# Patient Record
Sex: MALE | Race: BLACK OR AFRICAN AMERICAN | NOT HISPANIC OR LATINO | RURAL
[De-identification: names, ages, dates, MRNs, and addresses within clinical notes are randomized per-mention and may not be internally consistent; named-entity substitution may affect disease eponyms.]

---

## 2024-10-28 ENCOUNTER — LAB VISIT (OUTPATIENT)
Dept: PRIMARY CARE CLINIC | Facility: CLINIC | Age: 39
End: 2024-10-28

## 2024-10-28 DIAGNOSIS — Z02.83 ENCOUNTER FOR DRUG SCREENING: Primary | ICD-10-CM

## 2024-10-28 PROCEDURE — 99000 SPECIMEN HANDLING OFFICE-LAB: CPT | Mod: ,,, | Performed by: NURSE PRACTITIONER

## 2024-12-21 ENCOUNTER — HOSPITAL ENCOUNTER (EMERGENCY)
Facility: HOSPITAL | Age: 39
Discharge: HOME | End: 2024-12-21
Attending: EMERGENCY MEDICINE

## 2024-12-21 VITALS
BODY MASS INDEX: 38.66 KG/M2 | TEMPERATURE: 98 F | RESPIRATION RATE: 20 BRPM | DIASTOLIC BLOOD PRESSURE: 82 MMHG | HEART RATE: 82 BPM | HEIGHT: 69 IN | SYSTOLIC BLOOD PRESSURE: 137 MMHG | OXYGEN SATURATION: 97 % | WEIGHT: 261 LBS

## 2024-12-21 DIAGNOSIS — R51.9 NONINTRACTABLE HEADACHE, UNSPECIFIED CHRONICITY PATTERN, UNSPECIFIED HEADACHE TYPE: Primary | ICD-10-CM

## 2024-12-21 LAB
AMPHET UR QL SCN: NEGATIVE
ANION GAP SERPL CALCULATED.3IONS-SCNC: 13 MMOL/L (ref 7–16)
BACTERIA #/AREA URNS HPF: ABNORMAL /HPF
BARBITURATES UR QL SCN: NEGATIVE
BASOPHILS # BLD AUTO: 0.04 K/UL (ref 0–0.2)
BASOPHILS NFR BLD AUTO: 0.4 % (ref 0–1)
BENZODIAZ METAB UR QL SCN: NEGATIVE
BILIRUB UR QL STRIP: NEGATIVE
BUN SERPL-MCNC: 15 MG/DL (ref 9–21)
BUN/CREAT SERPL: 12 (ref 6–20)
CALCIUM SERPL-MCNC: 8.9 MG/DL (ref 8.4–10.2)
CANNABINOIDS UR QL SCN: NEGATIVE
CHLORIDE SERPL-SCNC: 106 MMOL/L (ref 98–107)
CLARITY UR: ABNORMAL
CO2 SERPL-SCNC: 26 MMOL/L (ref 22–29)
COCAINE UR QL SCN: NEGATIVE
COLOR UR: ABNORMAL
CREAT SERPL-MCNC: 1.29 MG/DL (ref 0.72–1.25)
DIFFERENTIAL METHOD BLD: ABNORMAL
EGFR (NO RACE VARIABLE) (RUSH/TITUS): 72 ML/MIN/1.73M2
EOSINOPHIL # BLD AUTO: 0.05 K/UL (ref 0–0.5)
EOSINOPHIL NFR BLD AUTO: 0.6 % (ref 1–4)
ERYTHROCYTE [DISTWIDTH] IN BLOOD BY AUTOMATED COUNT: 12.9 % (ref 11.5–14.5)
GLUCOSE SERPL-MCNC: 105 MG/DL (ref 74–100)
GLUCOSE UR STRIP-MCNC: NORMAL MG/DL
HCT VFR BLD AUTO: 44.7 % (ref 40–54)
HGB BLD-MCNC: 14 G/DL (ref 13.5–18)
IMM GRANULOCYTES # BLD AUTO: 0.02 K/UL (ref 0–0.04)
IMM GRANULOCYTES NFR BLD: 0.2 % (ref 0–0.4)
KETONES UR STRIP-SCNC: NEGATIVE MG/DL
LEUKOCYTE ESTERASE UR QL STRIP: ABNORMAL
LYMPHOCYTES # BLD AUTO: 1.26 K/UL (ref 1–4.8)
LYMPHOCYTES NFR BLD AUTO: 13.9 % (ref 27–41)
MCH RBC QN AUTO: 30.4 PG (ref 27–31)
MCHC RBC AUTO-ENTMCNC: 31.3 G/DL (ref 32–36)
MCV RBC AUTO: 97 FL (ref 80–96)
MONOCYTES # BLD AUTO: 0.75 K/UL (ref 0–0.8)
MONOCYTES NFR BLD AUTO: 8.3 % (ref 2–6)
MPC BLD CALC-MCNC: 10.1 FL (ref 9.4–12.4)
MUCOUS, UA: ABNORMAL /LPF
NEUTROPHILS # BLD AUTO: 6.95 K/UL (ref 1.8–7.7)
NEUTROPHILS NFR BLD AUTO: 76.6 % (ref 53–65)
NITRITE UR QL STRIP: NEGATIVE
NRBC # BLD AUTO: 0 X10E3/UL
NRBC, AUTO (.00): 0 %
OPIATES UR QL SCN: NEGATIVE
PCP UR QL SCN: NEGATIVE
PH UR STRIP: 7 PH UNITS
PLATELET # BLD AUTO: 289 K/UL (ref 150–400)
POTASSIUM SERPL-SCNC: 4.5 MMOL/L (ref 3.5–5.1)
PROT UR QL STRIP: NEGATIVE
RBC # BLD AUTO: 4.61 M/UL (ref 4.6–6.2)
RBC # UR STRIP: NEGATIVE /UL
RBC #/AREA URNS HPF: 3 /HPF
SODIUM SERPL-SCNC: 140 MMOL/L (ref 136–145)
SP GR UR STRIP: 1.01
SQUAMOUS #/AREA URNS LPF: ABNORMAL /HPF
UROBILINOGEN UR STRIP-ACNC: NORMAL MG/DL
WBC # BLD AUTO: 9.07 K/UL (ref 4.5–11)
WBC #/AREA URNS HPF: 84 /HPF

## 2024-12-21 PROCEDURE — 99285 EMERGENCY DEPT VISIT HI MDM: CPT | Mod: 25

## 2024-12-21 PROCEDURE — 96374 THER/PROPH/DIAG INJ IV PUSH: CPT

## 2024-12-21 PROCEDURE — 36415 COLL VENOUS BLD VENIPUNCTURE: CPT | Performed by: EMERGENCY MEDICINE

## 2024-12-21 PROCEDURE — 85025 COMPLETE CBC W/AUTO DIFF WBC: CPT | Performed by: EMERGENCY MEDICINE

## 2024-12-21 PROCEDURE — 96375 TX/PRO/DX INJ NEW DRUG ADDON: CPT

## 2024-12-21 PROCEDURE — 80048 BASIC METABOLIC PNL TOTAL CA: CPT | Performed by: EMERGENCY MEDICINE

## 2024-12-21 PROCEDURE — 81003 URINALYSIS AUTO W/O SCOPE: CPT | Mod: 59 | Performed by: EMERGENCY MEDICINE

## 2024-12-21 PROCEDURE — 63600175 PHARM REV CODE 636 W HCPCS: Performed by: EMERGENCY MEDICINE

## 2024-12-21 PROCEDURE — 87086 URINE CULTURE/COLONY COUNT: CPT | Performed by: EMERGENCY MEDICINE

## 2024-12-21 PROCEDURE — 80307 DRUG TEST PRSMV CHEM ANLYZR: CPT | Performed by: EMERGENCY MEDICINE

## 2024-12-21 PROCEDURE — 63600175 PHARM REV CODE 636 W HCPCS: Performed by: NURSE PRACTITIONER

## 2024-12-21 RX ORDER — KETOROLAC TROMETHAMINE 15 MG/ML
15 INJECTION, SOLUTION INTRAMUSCULAR; INTRAVENOUS
Status: COMPLETED | OUTPATIENT
Start: 2024-12-21 | End: 2024-12-21

## 2024-12-21 RX ORDER — HYDRALAZINE HYDROCHLORIDE 20 MG/ML
10 INJECTION INTRAMUSCULAR; INTRAVENOUS
Status: COMPLETED | OUTPATIENT
Start: 2024-12-21 | End: 2024-12-21

## 2024-12-21 RX ADMIN — HYDRALAZINE HYDROCHLORIDE 10 MG: 20 INJECTION INTRAMUSCULAR; INTRAVENOUS at 04:12

## 2024-12-21 RX ADMIN — KETOROLAC TROMETHAMINE 15 MG: 15 INJECTION, SOLUTION INTRAMUSCULAR; INTRAVENOUS at 05:12

## 2024-12-21 NOTE — ED PROVIDER NOTES
Encounter Date: 12/21/2024       History     Chief Complaint   Patient presents with    Headache    Hematemesis     38 y/o male presents for reported headache and vomiting.  His headaches are actually frequent, but today severe.  His BP is elevated, he has been told this in the past and prescribed medications but reports has not taken them since probably October.  At that time he was in senior living.  There are no neurological deficits noted on exam.  Vomiting began this morning, also with diarrhea.  Denies fever.      Review of patient's allergies indicates:  No Known Allergies  History reviewed. No pertinent past medical history.  History reviewed. No pertinent surgical history.  No family history on file.     Review of Systems   Constitutional:  Negative for fever.   HENT:  Negative for sore throat.    Respiratory:  Negative for shortness of breath.    Cardiovascular:  Negative for chest pain.   Gastrointestinal:  Negative for nausea.   Genitourinary:  Negative for dysuria.   Musculoskeletal:  Negative for back pain.   Skin:  Negative for rash.   Neurological:  Positive for dizziness and headaches. Negative for weakness.   Hematological:  Does not bruise/bleed easily.   All other systems reviewed and are negative.      Physical Exam     Initial Vitals [12/21/24 1457]   BP Pulse Resp Temp SpO2   (!) 196/152 73 20 97.7 °F (36.5 °C) 97 %      MAP       --         Physical Exam    Constitutional: He appears well-developed and well-nourished.   HENT:   Head: Normocephalic.   Eyes: EOM are normal. Pupils are equal, round, and reactive to light.   Neck: Neck supple.   Cardiovascular:  Normal rate, regular rhythm, normal heart sounds and intact distal pulses.           Pulmonary/Chest: Breath sounds normal.   Abdominal: Abdomen is soft. Bowel sounds are normal.   Musculoskeletal:         General: Normal range of motion.      Cervical back: Neck supple.     Neurological: He is alert and oriented to person, place, and time. He  has normal strength. No cranial nerve deficit or sensory deficit. GCS score is 15. GCS eye subscore is 4. GCS verbal subscore is 5. GCS motor subscore is 6.   Skin: Skin is warm and dry. Capillary refill takes less than 2 seconds.   Psychiatric: He has a normal mood and affect. His behavior is normal. Thought content normal.         Medical Screening Exam   See Full Note    ED Course   Procedures  Labs Reviewed   CULTURE, URINE - Abnormal       Result Value    Culture, Urine >100,000 Streptococcus agalactiae (Group B) (*)    BASIC METABOLIC PANEL - Abnormal    Sodium 140      Potassium 4.5      Chloride 106      CO2 26      Anion Gap 13      Glucose 105 (*)     BUN 15      Creatinine 1.29 (*)     BUN/Creatinine Ratio 12      Calcium 8.9      eGFR 72     CBC WITH DIFFERENTIAL - Abnormal    WBC 9.07      RBC 4.61      Hemoglobin 14.0      Hematocrit 44.7      MCV 97.0 (*)     MCH 30.4      MCHC 31.3 (*)     RDW 12.9      Platelet Count 289      MPV 10.1      Neutrophils % 76.6 (*)     Lymphocytes % 13.9 (*)     Monocytes % 8.3 (*)     Eosinophils % 0.6 (*)     Basophils % 0.4      Immature Granulocytes % 0.2      nRBC, Auto 0.0      Neutrophils, Abs 6.95      Lymphocytes, Absolute 1.26      Monocytes, Absolute 0.75      Eosinophils, Absolute 0.05      Basophils, Absolute 0.04      Immature Granulocytes, Absolute 0.02      nRBC, Absolute 0.00      Diff Type Auto     URINALYSIS, REFLEX TO URINE CULTURE - Abnormal    Color, UA Light-Yellow      Clarity, UA Turbid      pH, UA 7.0      Leukocytes, UA Large (*)     Nitrites, UA Negative      Protein, UA Negative      Glucose, UA Normal      Ketones, UA Negative      Urobilinogen, UA Normal      Bilirubin, UA Negative      Blood, UA Negative      Specific Gravity, UA 1.009     URINALYSIS, MICROSCOPIC - Abnormal    WBC, UA 84 (*)     RBC, UA 3      Bacteria, UA Few (*)     Squamous Epithelial Cells, UA Occasional (*)     Mucous Occasional (*)    DRUG SCREEN, URINE (BEAKER) -  Normal    Barbiturates, Urine Negative      Benzodiazepine, Urine Negative      Opiates, Urine Negative      Phencyclidine, Urine Negative      Amphetamine, Urine Negative      Cannabinoid, Urine Negative      Cocaine, Urine Negative      Narrative:     This screen includes the following classes of drugs at the listed cut-off:    Benzodiazepines 200 ng/ml  Cocaine metabolite 300 ng/ml  Opiates 2000 ng/ml  Barbiturates 200 ng/ml  Amphetamines 500 ng/ml  Marijuana metabs (THC) 50 ng/ml  Phencyclidine (PCP) 25 ng/ml    This is a screening test. If results do not correlate with clinical presentation, then a confirmatory send out test is advised.   CBC W/ AUTO DIFFERENTIAL    Narrative:     The following orders were created for panel order CBC auto differential.  Procedure                               Abnormality         Status                     ---------                               -----------         ------                     CBC with Differential[[id number]]       Abnormal            Final result                 Please view results for these tests on the individual orders.          Imaging Results              CT Head Without Contrast (Final result)  Result time 12/21/24 15:45:47      Final result by Edgar Arias MD (12/21/24 15:45:47)                   Impression:      No acute intracranial process.  Additional evaluation, as clinically warranted.      Electronically signed by: Edgar Arias MD  Date:    12/21/2024  Time:    15:45               Narrative:    EXAMINATION:  CT HEAD WITHOUT CONTRAST    CLINICAL HISTORY:  Headache, new or worsening, neuro deficit (Age 19-49y);    TECHNIQUE:  Low dose axial images were obtained through the head.  Coronal and sagittal reformations were also performed. Contrast was not administered.    COMPARISON:  None.    FINDINGS:  The subcutaneous tissues are unremarkable.  The bony calvarium is intact.  The paranasal sinuses are unremarkable.  The mastoid air cells are clear.   The orbits and intraorbital contents are within normal limits.    The craniocervical junction is intact.  The sellar and parasellar structures are unremarkable.  There is no evidence of intracranial hemorrhage.  There are no extra-axial fluid collections.  There are calcifications along the falx and dura.    The ventricles and sulci are within normal limits.  The cisterns are unremarkable.  The gray-white differentiation is maintained.  There is no dense vessel sign.  There is no evidence of mass effect.                                       Medications   hydrALAZINE injection 10 mg (10 mg Intravenous Given 12/21/24 1610)   ketorolac injection 15 mg (15 mg Intravenous Given 12/21/24 1756)     Medical Decision Making  40 y/o male presents for reported headache and vomiting.  His headaches are actually frequent, but today severe.  His BP is elevated, he has been told this in the past and prescribed medications but reports has not taken them since probably October.  At that time [facility] was in halfway.  There are no neurological deficits noted on exam.  Vomiting began this morning, also with diarrhea.  Denies fever.    Amount and/or Complexity of Data Reviewed  Labs: ordered.  Radiology: ordered.    Risk  Prescription drug management.                                      Clinical Impression:   Final diagnoses:  [R51.9] Nonintractable headache, unspecified chronicity pattern, unspecified headache type (Primary)        ED Disposition Condition    Discharge Stable          ED Prescriptions    None       Follow-up Information       Follow up With Specialties Details Why Contact Info    PRIMARY CARE PROVIDER OR THIS DEPARTMENT  Schedule an appointment as soon as possible for a visit                Daniele Rivera FNP  12/23/24 0603

## 2024-12-21 NOTE — DISCHARGE INSTRUCTIONS
FOLLOW UP WITH PRIMARY CARE PROVIDER TO RECHECK BLOOD PRESSURE IN 7 TO 10 DAYS.  RETURN TO THE EMERGENCY DEPARTMENT AS NEEDED.

## 2024-12-21 NOTE — Clinical Note
"Nikolay Washingtonrick" Gregorio was seen and treated in our emergency department on 12/21/2024.  He may return to work on 12/23/2024.       If you have any questions or concerns, please don't hesitate to call.      Ciara CAMPOS    "

## 2024-12-21 NOTE — ED TRIAGE NOTES
Nikolay Gregorio, a 39 y.o. male presents to [facility] via POV with a chief complaint of HEADACHE AND VOMITING BLOOD X 1 DAY      Triage Note:  Chief Complaint   Patient presents with    Headache    Hematemesis     No, Primary Doctor  Review of patient's allergies indicates:  No Known Allergies  No past medical history on file.  No past surgical history on file.     No past surgical history on file.  Vitals:    12/21/24 1457   BP: (!) 196/152   Pulse: 73   Resp: 20   Temp: 97.7 °F (36.5 °C)     No current facility-administered medications for this encounter.     No current outpatient medications on file.

## 2024-12-23 LAB — UA COMPLETE W REFLEX CULTURE PNL UR: ABNORMAL

## 2024-12-24 ENCOUNTER — TELEPHONE (OUTPATIENT)
Dept: EMERGENCY MEDICINE | Facility: HOSPITAL | Age: 39
End: 2024-12-24

## 2024-12-24 RX ORDER — CEPHALEXIN 500 MG/1
500 CAPSULE ORAL EVERY 12 HOURS
Qty: 14 CAPSULE | Refills: 0 | Status: SHIPPED | OUTPATIENT
Start: 2024-12-24 | End: 2024-12-31

## 2024-12-24 NOTE — TELEPHONE ENCOUNTER
----- Message from TIFFANY Edwards sent at 12/24/2024  9:17 AM CST -----  Please notify the patient that I sent in a rx for antibiotics

## 2025-02-09 ENCOUNTER — HOSPITAL ENCOUNTER (EMERGENCY)
Facility: HOSPITAL | Age: 40
Discharge: HOME | End: 2025-02-09
Attending: EMERGENCY MEDICINE
Payer: COMMERCIAL

## 2025-02-09 VITALS
HEIGHT: 69 IN | OXYGEN SATURATION: 96 % | BODY MASS INDEX: 39.62 KG/M2 | WEIGHT: 267.5 LBS | SYSTOLIC BLOOD PRESSURE: 139 MMHG | DIASTOLIC BLOOD PRESSURE: 92 MMHG | HEART RATE: 89 BPM | TEMPERATURE: 99 F | RESPIRATION RATE: 18 BRPM

## 2025-02-09 DIAGNOSIS — R30.0 DYSURIA: ICD-10-CM

## 2025-02-09 DIAGNOSIS — K52.9 GASTROENTERITIS: Primary | ICD-10-CM

## 2025-02-09 DIAGNOSIS — L02.91 ABSCESS: ICD-10-CM

## 2025-02-09 DIAGNOSIS — R10.9 ABDOMINAL PAIN, UNSPECIFIED ABDOMINAL LOCATION: ICD-10-CM

## 2025-02-09 PROCEDURE — 99283 EMERGENCY DEPT VISIT LOW MDM: CPT

## 2025-02-09 RX ORDER — SULFAMETHOXAZOLE AND TRIMETHOPRIM 800; 160 MG/1; MG/1
1 TABLET ORAL 2 TIMES DAILY
Qty: 20 TABLET | Refills: 0 | Status: SHIPPED | OUTPATIENT
Start: 2025-02-09 | End: 2025-02-19

## 2025-02-09 NOTE — Clinical Note
"Nikolay Gregorio (Kendrick) was seen and treated in our emergency department on 2/9/2025.  He may return to work on 02/10/2025.       If you have any questions or concerns, please don't hesitate to call.      Lula CAMPOS    "

## 2025-02-09 NOTE — Clinical Note
"Nikolay Washingtonrick" Gregorio was seen and treated in our emergency department on 2/9/2025.  He may return to work on 02/09/2025.       If you have any questions or concerns, please don't hesitate to call.      Lula CAMPOS    "

## 2025-02-09 NOTE — DISCHARGE INSTRUCTIONS
Take antibiotics as prescribed.  Follow up in clinic with primary care provider in 2-3 days for recheck.  Return to emergency department for any worsening or further problems.

## 2025-02-09 NOTE — ED PROVIDER NOTES
"Encounter Date: 2/9/2025       History     Chief Complaint   Patient presents with    Abdominal Pain    Nausea    Vomiting     Presents to ED for complaints of abdominal pain, nausea, vomiting and hemorrhoids that started this morning.  Patient has history of bladder stimulator.  Patient also complaints of "boils" on his chest and under arms.     Patient is a 39-year-old male with history of hypertension and "prostate problems".  Patient states that a few days ago who is having nausea and vomiting and now is having some diarrhea and lower abdominal pain and cramping.  Patient also reports some dysuria that he states is typical of what he has had in the past with urinary tract infection.  Patient does note that he has seen blood when he wipes which has been going on for some time.  No blood in toilet.  No fever, no other acute problems or complaints at this time.  Patient does report some rectal pain.        Review of patient's allergies indicates:  No Known Allergies  No past medical history on file.  No past surgical history on file.  No family history on file.     Review of Systems   Gastrointestinal:  Positive for abdominal pain, diarrhea, nausea and vomiting.   Genitourinary:  Positive for dysuria.   All other systems reviewed and are negative.      Physical Exam     Initial Vitals [02/09/25 0721]   BP Pulse Resp Temp SpO2   (!) 139/92 89 18 98.7 °F (37.1 °C) 96 %      MAP       --         Physical Exam    Nursing note and vitals reviewed.  Constitutional: He appears well-developed and well-nourished.   HENT:   Head: Normocephalic and atraumatic. Mouth/Throat: Oropharynx is clear and moist.   Eyes: Pupils are equal, round, and reactive to light.   Neck: Neck supple.   Normal range of motion.  Cardiovascular:  Normal rate and regular rhythm.           Pulmonary/Chest: Effort normal and breath sounds normal.   Abdominal: Abdomen is soft. He exhibits no distension.   Genitourinary:    Penis normal.      " Genitourinary Comments: There is no visible or palpable perirectal abscess.  No visible external hemorrhoids.  No blood.     Musculoskeletal:         General: Normal range of motion.      Cervical back: Normal range of motion and neck supple.     Neurological: He is alert.   Skin: Skin is warm. Capillary refill takes less than 2 seconds.   There is a small superficial abscess of left chest and also in right axilla.  Both are very small and superficial.   Psychiatric: He has a normal mood and affect.         Medical Screening Exam   See Full Note    ED Course   Procedures  Labs Reviewed - No data to display       Imaging Results    None          Medications - No data to display  Medical Decision Making             ED Course as of 02/09/25 0741   Sun Feb 09, 2025   0731 Medical decision-making:  Differential diagnosis includes nausea, vomiting, diarrhea, gastroenteritis, abdominal pain, UTI, hemorrhoids, internal hemorrhoids. [BB]      ED Course User Index  [BB] Lázaro Dinh MD                           Clinical Impression:   Final diagnoses:  [K52.9] Gastroenteritis (Primary)  [R10.9] Abdominal pain, unspecified abdominal location  [R30.0] Dysuria  [L02.91] Abscess        ED Disposition Condition    Discharge Stable          ED Prescriptions       Medication Sig Dispense Start Date End Date Auth. Provider    sulfamethoxazole-trimethoprim 800-160mg (BACTRIM DS) 800-160 mg Tab Take 1 tablet by mouth 2 (two) times daily. for 10 days 20 tablet 2/9/2025 2/19/2025 Lázaro Dinh MD          Follow-up Information       Follow up With Specialties Details Why Contact Info    Yuri Velarde MD Family Medicine Schedule an appointment as soon as possible for a visit   [address] [facility] [address]  [phone]               Lázaro Dinh MD  02/09/25 1858

## 2025-03-01 ENCOUNTER — OFFICE VISIT (OUTPATIENT)
Dept: FAMILY MEDICINE | Facility: CLINIC | Age: 40
End: 2025-03-01
Payer: COMMERCIAL

## 2025-03-01 VITALS
OXYGEN SATURATION: 94 % | DIASTOLIC BLOOD PRESSURE: 97 MMHG | SYSTOLIC BLOOD PRESSURE: 144 MMHG | BODY MASS INDEX: 40.58 KG/M2 | HEIGHT: 69 IN | RESPIRATION RATE: 20 BRPM | WEIGHT: 274 LBS | TEMPERATURE: 98 F | HEART RATE: 74 BPM

## 2025-03-01 DIAGNOSIS — Z72.51 HIGH RISK HETEROSEXUAL BEHAVIOR: ICD-10-CM

## 2025-03-01 DIAGNOSIS — K59.00 CONSTIPATION, UNSPECIFIED CONSTIPATION TYPE: ICD-10-CM

## 2025-03-01 DIAGNOSIS — I10 HYPERTENSION, UNSPECIFIED TYPE: ICD-10-CM

## 2025-03-01 DIAGNOSIS — N40.0 BENIGN PROSTATIC HYPERPLASIA, UNSPECIFIED WHETHER LOWER URINARY TRACT SYMPTOMS PRESENT: Primary | ICD-10-CM

## 2025-03-01 DIAGNOSIS — K64.9 HEMORRHOIDS, UNSPECIFIED HEMORRHOID TYPE: ICD-10-CM

## 2025-03-01 PROCEDURE — 99051 MED SERV EVE/WKEND/HOLIDAY: CPT | Mod: ,,, | Performed by: FAMILY MEDICINE

## 2025-03-01 PROCEDURE — 83036 HEMOGLOBIN GLYCOSYLATED A1C: CPT | Mod: GZ,,, | Performed by: CLINICAL MEDICAL LABORATORY

## 2025-03-01 PROCEDURE — 3008F BODY MASS INDEX DOCD: CPT | Mod: CPTII,,, | Performed by: FAMILY MEDICINE

## 2025-03-01 PROCEDURE — 87491 CHLMYD TRACH DNA AMP PROBE: CPT | Mod: ,,, | Performed by: CLINICAL MEDICAL LABORATORY

## 2025-03-01 PROCEDURE — 1160F RVW MEDS BY RX/DR IN RCRD: CPT | Mod: CPTII,,, | Performed by: FAMILY MEDICINE

## 2025-03-01 PROCEDURE — 3080F DIAST BP >= 90 MM HG: CPT | Mod: CPTII,,, | Performed by: FAMILY MEDICINE

## 2025-03-01 PROCEDURE — 1159F MED LIST DOCD IN RCRD: CPT | Mod: CPTII,,, | Performed by: FAMILY MEDICINE

## 2025-03-01 PROCEDURE — 87661 TRICHOMONAS VAGINALIS AMPLIF: CPT | Mod: ,,, | Performed by: CLINICAL MEDICAL LABORATORY

## 2025-03-01 PROCEDURE — 3077F SYST BP >= 140 MM HG: CPT | Mod: CPTII,,, | Performed by: FAMILY MEDICINE

## 2025-03-01 PROCEDURE — 86780 TREPONEMA PALLIDUM: CPT | Mod: ,,, | Performed by: CLINICAL MEDICAL LABORATORY

## 2025-03-01 PROCEDURE — 80053 COMPREHEN METABOLIC PANEL: CPT | Mod: ,,, | Performed by: CLINICAL MEDICAL LABORATORY

## 2025-03-01 PROCEDURE — 86803 HEPATITIS C AB TEST: CPT | Mod: ,,, | Performed by: CLINICAL MEDICAL LABORATORY

## 2025-03-01 PROCEDURE — 99204 OFFICE O/P NEW MOD 45 MIN: CPT | Mod: ,,, | Performed by: FAMILY MEDICINE

## 2025-03-01 PROCEDURE — 85025 COMPLETE CBC W/AUTO DIFF WBC: CPT | Mod: ,,, | Performed by: CLINICAL MEDICAL LABORATORY

## 2025-03-01 PROCEDURE — 87389 HIV-1 AG W/HIV-1&-2 AB AG IA: CPT | Mod: ,,, | Performed by: CLINICAL MEDICAL LABORATORY

## 2025-03-01 PROCEDURE — 87591 N.GONORRHOEAE DNA AMP PROB: CPT | Mod: ,,, | Performed by: CLINICAL MEDICAL LABORATORY

## 2025-03-01 RX ORDER — POLYETHYLENE GLYCOL 3350 17 G/17G
POWDER, FOR SOLUTION ORAL
Qty: 507 G | Refills: 0 | Status: SHIPPED | OUTPATIENT
Start: 2025-03-01

## 2025-03-01 RX ORDER — HYDROCHLOROTHIAZIDE 25 MG/1
25 TABLET ORAL DAILY
Qty: 90 TABLET | Refills: 1 | Status: SHIPPED | OUTPATIENT
Start: 2025-03-01 | End: 2026-03-01

## 2025-03-01 RX ORDER — TAMSULOSIN HYDROCHLORIDE 0.4 MG/1
0.4 CAPSULE ORAL DAILY
Qty: 90 CAPSULE | Refills: 1 | Status: SHIPPED | OUTPATIENT
Start: 2025-03-01 | End: 2026-03-01

## 2025-03-01 RX ORDER — AMLODIPINE BESYLATE 5 MG/1
5 TABLET ORAL DAILY
Qty: 90 TABLET | Refills: 1 | Status: SHIPPED | OUTPATIENT
Start: 2025-03-01 | End: 2026-03-01

## 2025-03-01 RX ORDER — BISACODYL 5 MG
15 TABLET, DELAYED RELEASE (ENTERIC COATED) ORAL ONCE
Qty: 3 TABLET | Refills: 0 | Status: SHIPPED | OUTPATIENT
Start: 2025-03-01 | End: 2025-03-01

## 2025-03-01 RX ORDER — HYDROCORTISONE 25 MG/G
CREAM TOPICAL 2 TIMES DAILY PRN
Qty: 28 G | Refills: 0 | Status: SHIPPED | OUTPATIENT
Start: 2025-03-01

## 2025-03-01 RX ORDER — PANTOPRAZOLE SODIUM 20 MG/1
20 TABLET, DELAYED RELEASE ORAL DAILY
Qty: 90 TABLET | Refills: 1 | Status: SHIPPED | OUTPATIENT
Start: 2025-03-01 | End: 2026-03-01

## 2025-03-01 NOTE — LETTER
March 1, 2025      [facility]  [address]  [place] [address]  Phone: [phone]  Fax: [phone]       Patient: Nikolay Gregorio   YOB: 1985  Date of Visit: 03/01/2025    To Whom It May Concern:    Ai Gregorio  was at [facility] on 03/01/2025. The patient may return to work/school on 03/04/2025 with no restrictions. If you have any questions or concerns, or if I can be of further assistance, please do not hesitate to contact me.    Sincerely,    Derek Fontenot II, DO

## 2025-03-01 NOTE — PROGRESS NOTES
Subjective:       Patient ID: Nikolay Gregorio is a 40 y.o. male.    Chief Complaint: Nausea (Nausea, throwing up, gerd, diarrhea, rectal bleeding, severe headaches. Supposed to be taking med for bp and flomax for prostate. Wants to have blood work done, A1C checked )    Patient was released from alf 4 months ago, says he started gaining weight around this time, started having acid reflux around this time.    Also with hx of enlarged prostate. Constipation intermittently.    Pt also reports rectal bleeding when wiping after BM.     Nausea  Associated symptoms include abdominal pain and nausea. Pertinent negatives include no arthralgias, change in bowel habit, chest pain, chills, congestion, coughing, diaphoresis, fatigue, fever, headaches, joint swelling, myalgias, neck pain, numbness, rash, sore throat, vertigo, vomiting or weakness.     Review of Systems   Constitutional:  Negative for activity change, appetite change, chills, diaphoresis, fatigue, fever and unexpected weight change.   HENT:  Negative for nasal congestion, dental problem, drooling, ear discharge, ear pain, facial swelling, hearing loss, mouth sores, nosebleeds, postnasal drip, rhinorrhea, sinus pressure/congestion, sneezing, sore throat, tinnitus, trouble swallowing, voice change and goiter.    Eyes:  Negative for photophobia, pain, discharge, redness, itching and visual disturbance.   Respiratory:  Negative for apnea, cough, choking, chest tightness, shortness of breath, wheezing and stridor.    Cardiovascular:  Negative for chest pain, palpitations, leg swelling and claudication.   Gastrointestinal:  Positive for abdominal pain, constipation and nausea. Negative for abdominal distention, anal bleeding, blood in stool, change in bowel habit, diarrhea, vomiting, reflux and fecal incontinence.   Endocrine: Negative for cold intolerance, heat intolerance, polydipsia, polyphagia and polyuria.   Genitourinary:  Negative for bladder incontinence,  decreased urine volume, difficulty urinating, discharge, dysuria, enuresis, erectile dysfunction, flank pain, frequency, genital sores, hematuria, penile pain, testicular pain and urgency.   Musculoskeletal:  Negative for arthralgias, back pain, gait problem, joint swelling, leg pain, myalgias, neck pain, neck stiffness and joint deformity.   Integumentary:  Negative for pallor, rash, wound and mole/lesion.   Allergic/Immunologic: Negative for environmental allergies, food allergies and frequent infections.   Neurological:  Negative for dizziness, vertigo, tremors, seizures, syncope, facial asymmetry, speech difficulty, weakness, light-headedness, numbness, headaches, memory loss and coordination difficulties.   Hematological:  Negative for adenopathy. Does not bruise/bleed easily.   Psychiatric/Behavioral:  Negative for agitation, behavioral problems, confusion, decreased concentration, dysphoric mood, hallucinations, self-injury, sleep disturbance and suicidal ideas. The patient is not nervous/anxious and is not hyperactive.          Objective:      Physical Exam  Vitals reviewed.   Constitutional:       Appearance: Normal appearance. He is normal weight.   HENT:      Head: Normocephalic and atraumatic.      Right Ear: Tympanic membrane and ear canal normal.      Left Ear: Tympanic membrane, ear canal and external ear normal.      Nose: Nose normal.      Mouth/Throat:      Mouth: Mucous membranes are moist.      Pharynx: Oropharynx is clear.   Eyes:      Extraocular Movements: Extraocular movements intact.      Conjunctiva/sclera: Conjunctivae normal.      Pupils: Pupils are equal, round, and reactive to light.   Cardiovascular:      Rate and Rhythm: Normal rate and regular rhythm.      Pulses: Normal pulses.      Heart sounds: Normal heart sounds.   Pulmonary:      Effort: Pulmonary effort is normal.      Breath sounds: Normal breath sounds.   Abdominal:      General: Abdomen is flat. Bowel sounds are normal.       Palpations: Abdomen is soft.   Musculoskeletal:         General: Normal range of motion.      Cervical back: Normal range of motion and neck supple.   Skin:     General: Skin is warm and dry.   Neurological:      General: No focal deficit present.      Mental Status: He is alert and oriented to person, place, and time. Mental status is at baseline.   Psychiatric:         Mood and Affect: Mood normal.         Behavior: Behavior normal.         Thought Content: Thought content normal.         Judgment: Judgment normal.         Assessment:       1. Benign prostatic hyperplasia, unspecified whether lower urinary tract symptoms present    2. Hemorrhoids, unspecified hemorrhoid type    3. Constipation, unspecified constipation type    4. High risk heterosexual behavior    5. Hypertension, unspecified type        Plan:     Benign prostatic hyperplasia, unspecified whether lower urinary tract symptoms present    Hemorrhoids, unspecified hemorrhoid type    Constipation, unspecified constipation type    High risk heterosexual behavior  -     HIV 1/2 Ag/Ab (4th Gen); Future; Expected date: 03/01/2025  -     Syphilis Antibody with reflex to RPR; Future; Expected date: 03/01/2025  -     Chlamydia/GC, PCR; Future; Expected date: 03/01/2025  -     Trichomonas vaginalis by PCR; Future; Expected date: 03/01/2025  -     Hepatitis C Antibody; Future; Expected date: 03/01/2025    Hypertension, unspecified type  -     CBC Auto Differential; Future; Expected date: 03/01/2025  -     Hemoglobin A1C; Future; Expected date: 03/01/2025  -     Comprehensive Metabolic Panel; Future; Expected date: 03/01/2025    Other orders  -     tamsulosin (FLOMAX) 0.4 mg Cap; Take 1 capsule (0.4 mg total) by mouth once daily.  Dispense: 90 capsule; Refill: 1  -     amLODIPine (NORVASC) 5 MG tablet; Take 1 tablet (5 mg total) by mouth once daily.  Dispense: 90 tablet; Refill: 1  -     hydroCHLOROthiazide (HYDRODIURIL) 25 MG tablet; Take 1 tablet (25 mg  total) by mouth once daily.  Dispense: 90 tablet; Refill: 1  -     polyethylene glycol (GLYCOLAX) 17 gram/dose powder; 2 capfuls by mouth daily x 3 days then 1 capful daily  Dispense: 507 g; Refill: 0  -     bisacodyL (DULCOLAX) 5 mg EC tablet; Take 3 tablets (15 mg total) by mouth once. for 1 dose  Dispense: 3 tablet; Refill: 0  -     pantoprazole (PROTONIX) 20 MG tablet; Take 1 tablet (20 mg total) by mouth once daily.  Dispense: 90 tablet; Refill: 1  -     hydrocortisone 2.5 % cream; Apply topically 2 (two) times daily as needed.  Dispense: 28 g; Refill: 0       Checking labs, will treat for GERD, hemorrhoids, BPH, will refill htn meds, checking std panel.

## 2025-03-02 LAB
ALBUMIN SERPL BCP-MCNC: 3.7 G/DL (ref 3.5–5)
ALBUMIN/GLOB SERPL: 0.9 {RATIO}
ALP SERPL-CCNC: 65 U/L (ref 40–150)
ALT SERPL W P-5'-P-CCNC: 34 U/L
ANION GAP SERPL CALCULATED.3IONS-SCNC: 9 MMOL/L (ref 7–16)
AST SERPL W P-5'-P-CCNC: 44 U/L (ref 5–34)
BASOPHILS # BLD AUTO: 0.05 K/UL (ref 0–0.2)
BASOPHILS NFR BLD AUTO: 0.8 % (ref 0–1)
BILIRUB SERPL-MCNC: 0.8 MG/DL
BUN SERPL-MCNC: 15 MG/DL (ref 9–21)
BUN/CREAT SERPL: 10 (ref 6–20)
CALCIUM SERPL-MCNC: 8.7 MG/DL (ref 8.4–10.2)
CHLORIDE SERPL-SCNC: 106 MMOL/L (ref 98–107)
CO2 SERPL-SCNC: 26 MMOL/L (ref 22–29)
CREAT SERPL-MCNC: 1.48 MG/DL (ref 0.72–1.25)
DIFFERENTIAL METHOD BLD: ABNORMAL
EGFR (NO RACE VARIABLE) (RUSH/TITUS): 61 ML/MIN/1.73M2
EOSINOPHIL # BLD AUTO: 0.26 K/UL (ref 0–0.5)
EOSINOPHIL NFR BLD AUTO: 4.3 % (ref 1–4)
ERYTHROCYTE [DISTWIDTH] IN BLOOD BY AUTOMATED COUNT: 13.2 % (ref 11.5–14.5)
EST. AVERAGE GLUCOSE BLD GHB EST-MCNC: 94 MG/DL
GLOBULIN SER-MCNC: 4.3 G/DL (ref 2–4)
GLUCOSE SERPL-MCNC: 91 MG/DL (ref 74–100)
HBA1C MFR BLD HPLC: 4.9 %
HCT VFR BLD AUTO: 43.1 % (ref 40–54)
HCV AB SER QL: NORMAL
HGB BLD-MCNC: 13.3 G/DL (ref 13.5–18)
HIV 1+O+2 AB SERPL QL: NORMAL
IMM GRANULOCYTES # BLD AUTO: 0.04 K/UL (ref 0–0.04)
IMM GRANULOCYTES NFR BLD: 0.7 % (ref 0–0.4)
LYMPHOCYTES # BLD AUTO: 1.4 K/UL (ref 1–4.8)
LYMPHOCYTES NFR BLD AUTO: 23.2 % (ref 27–41)
MCH RBC QN AUTO: 30.6 PG (ref 27–31)
MCHC RBC AUTO-ENTMCNC: 30.9 G/DL (ref 32–36)
MCV RBC AUTO: 99.1 FL (ref 80–96)
MONOCYTES # BLD AUTO: 0.73 K/UL (ref 0–0.8)
MONOCYTES NFR BLD AUTO: 12.1 % (ref 2–6)
MPC BLD CALC-MCNC: 10.3 FL (ref 9.4–12.4)
NEUTROPHILS # BLD AUTO: 3.55 K/UL (ref 1.8–7.7)
NEUTROPHILS NFR BLD AUTO: 58.9 % (ref 53–65)
NRBC # BLD AUTO: 0 X10E3/UL
NRBC, AUTO (.00): 0 %
PLATELET # BLD AUTO: 299 K/UL (ref 150–400)
POTASSIUM SERPL-SCNC: 4.5 MMOL/L (ref 3.5–5.1)
PROT SERPL-MCNC: 8 G/DL (ref 6.4–8.3)
RBC # BLD AUTO: 4.35 M/UL (ref 4.6–6.2)
SODIUM SERPL-SCNC: 136 MMOL/L (ref 136–145)
SYPHILIS AB INTERPRETATION: NORMAL
WBC # BLD AUTO: 6.03 K/UL (ref 4.5–11)

## 2025-03-03 LAB
CHLAMYDIA BY PCR: NEGATIVE
N. GONORRHOEAE (GC) BY PCR: NEGATIVE
TRICHOMONAS NAT: NEGATIVE

## 2025-03-04 ENCOUNTER — RESULTS FOLLOW-UP (OUTPATIENT)
Dept: FAMILY MEDICINE | Facility: CLINIC | Age: 40
End: 2025-03-04

## 2025-03-06 NOTE — TELEPHONE ENCOUNTER
----- Message from Derek Fontenot DO sent at 3/4/2025  2:33 PM CST -----  Creatinine which is a measure of kidney function is mildly elevated, recommend repeat of this test in a couple of months, if still elevated may need to see nephrology, notify patient, rest of labs   are in a good range  ----- Message -----  From: Lab, Background User  Sent: 3/2/2025   3:02 PM CST  To: Derek Fontenot II, DO

## 2025-03-10 ENCOUNTER — TELEPHONE (OUTPATIENT)
Dept: FAMILY MEDICINE | Facility: CLINIC | Age: 40
End: 2025-03-10

## 2025-03-10 NOTE — TELEPHONE ENCOUNTER
----- Message from Antonietta sent at 3/6/2025 12:39 PM CST -----  Pt need a callback regarding understand lab results that he received. Callback # [phone]

## 2025-03-21 ENCOUNTER — OFFICE VISIT (OUTPATIENT)
Dept: FAMILY MEDICINE | Facility: CLINIC | Age: 40
End: 2025-03-21
Payer: COMMERCIAL

## 2025-03-21 VITALS
TEMPERATURE: 98 F | SYSTOLIC BLOOD PRESSURE: 158 MMHG | RESPIRATION RATE: 18 BRPM | WEIGHT: 278.81 LBS | BODY MASS INDEX: 41.3 KG/M2 | DIASTOLIC BLOOD PRESSURE: 100 MMHG | HEIGHT: 69 IN | HEART RATE: 75 BPM

## 2025-03-21 DIAGNOSIS — G89.29 CHRONIC MIDLINE LOW BACK PAIN WITHOUT SCIATICA: ICD-10-CM

## 2025-03-21 DIAGNOSIS — M54.50 CHRONIC MIDLINE LOW BACK PAIN WITHOUT SCIATICA: ICD-10-CM

## 2025-03-21 DIAGNOSIS — I10 HYPERTENSION, UNSPECIFIED TYPE: ICD-10-CM

## 2025-03-21 DIAGNOSIS — Z00.00 ROUTINE GENERAL MEDICAL EXAMINATION AT A HEALTH CARE FACILITY: Primary | ICD-10-CM

## 2025-03-21 PROBLEM — R51.9 NONINTRACTABLE HEADACHE: Status: RESOLVED | Noted: 2024-12-21 | Resolved: 2025-03-21

## 2025-03-21 LAB
ALBUMIN SERPL BCP-MCNC: 3.8 G/DL (ref 3.5–5)
ALBUMIN/GLOB SERPL: 0.9 {RATIO}
ALP SERPL-CCNC: 62 U/L (ref 40–150)
ALT SERPL W P-5'-P-CCNC: 28 U/L
ANION GAP SERPL CALCULATED.3IONS-SCNC: 10 MMOL/L (ref 7–16)
AST SERPL W P-5'-P-CCNC: 38 U/L (ref 11–45)
BASOPHILS # BLD AUTO: 0.06 K/UL (ref 0–0.2)
BASOPHILS NFR BLD AUTO: 0.9 % (ref 0–1)
BILIRUB SERPL-MCNC: 0.8 MG/DL
BUN SERPL-MCNC: 15 MG/DL (ref 9–21)
BUN/CREAT SERPL: 10 (ref 6–20)
CALCIUM SERPL-MCNC: 9.5 MG/DL (ref 8.4–10.2)
CHLORIDE SERPL-SCNC: 105 MMOL/L (ref 98–107)
CHOLEST SERPL-MCNC: 180 MG/DL
CHOLEST/HDLC SERPL: 4.1 {RATIO}
CO2 SERPL-SCNC: 24 MMOL/L (ref 22–29)
CREAT SERPL-MCNC: 1.5 MG/DL (ref 0.72–1.25)
DIFFERENTIAL METHOD BLD: ABNORMAL
EGFR (NO RACE VARIABLE) (RUSH/TITUS): 60 ML/MIN/1.73M2
EOSINOPHIL # BLD AUTO: 0.29 K/UL (ref 0–0.5)
EOSINOPHIL NFR BLD AUTO: 4.2 % (ref 1–4)
ERYTHROCYTE [DISTWIDTH] IN BLOOD BY AUTOMATED COUNT: 12.7 % (ref 11.5–14.5)
GLOBULIN SER-MCNC: 4.2 G/DL (ref 2–4)
GLUCOSE SERPL-MCNC: 95 MG/DL (ref 74–100)
HCT VFR BLD AUTO: 42.8 % (ref 40–54)
HDLC SERPL-MCNC: 44 MG/DL (ref 35–60)
HGB BLD-MCNC: 13.3 G/DL (ref 13.5–18)
IMM GRANULOCYTES # BLD AUTO: 0.04 K/UL (ref 0–0.04)
IMM GRANULOCYTES NFR BLD: 0.6 % (ref 0–0.4)
LDLC SERPL CALC-MCNC: 115 MG/DL
LDLC/HDLC SERPL: 2.6 {RATIO}
LYMPHOCYTES # BLD AUTO: 1.87 K/UL (ref 1–4.8)
LYMPHOCYTES NFR BLD AUTO: 27.1 % (ref 27–41)
MCH RBC QN AUTO: 30.2 PG (ref 27–31)
MCHC RBC AUTO-ENTMCNC: 31.1 G/DL (ref 32–36)
MCV RBC AUTO: 97.3 FL (ref 80–96)
MONOCYTES # BLD AUTO: 0.62 K/UL (ref 0–0.8)
MONOCYTES NFR BLD AUTO: 9 % (ref 2–6)
MPC BLD CALC-MCNC: 10.5 FL (ref 9.4–12.4)
NEUTROPHILS # BLD AUTO: 4.03 K/UL (ref 1.8–7.7)
NEUTROPHILS NFR BLD AUTO: 58.2 % (ref 53–65)
NONHDLC SERPL-MCNC: 136 MG/DL
NRBC # BLD AUTO: 0 X10E3/UL
NRBC, AUTO (.00): 0 %
PLATELET # BLD AUTO: 336 K/UL (ref 150–400)
POTASSIUM SERPL-SCNC: 4.2 MMOL/L (ref 3.5–5.1)
PROT SERPL-MCNC: 8 G/DL (ref 6.4–8.3)
RBC # BLD AUTO: 4.4 M/UL (ref 4.6–6.2)
SODIUM SERPL-SCNC: 135 MMOL/L (ref 136–145)
TRIGL SERPL-MCNC: 105 MG/DL (ref 34–140)
TSH SERPL DL<=0.005 MIU/L-ACNC: 2.17 UIU/ML (ref 0.35–4.94)
VLDLC SERPL-MCNC: 21 MG/DL
WBC # BLD AUTO: 6.91 K/UL (ref 4.5–11)

## 2025-03-21 PROCEDURE — 80050 GENERAL HEALTH PANEL: CPT | Mod: ,,, | Performed by: CLINICAL MEDICAL LABORATORY

## 2025-03-21 PROCEDURE — 80061 LIPID PANEL: CPT | Mod: ,,, | Performed by: CLINICAL MEDICAL LABORATORY

## 2025-03-21 RX ORDER — AMLODIPINE BESYLATE 10 MG/1
10 TABLET ORAL DAILY
Qty: 90 TABLET | Refills: 3 | Status: SHIPPED | OUTPATIENT
Start: 2025-03-21 | End: 2026-03-21

## 2025-03-21 NOTE — PROGRESS NOTES
Subjective     Patient ID: Nikolay Gregorio is a 40 y.o. male.    Chief Complaint: Establish Care, Health Maintenance (Lipid Panel Never done/TETANUS VACCINE unknown/Influenza Vaccine(1) Never done/COVID-19 Vaccine(1 - 2024-25 season) Never done ), and Annual Exam    Pt presents for a wellness visit and to establish care.      Review of Systems   Constitutional:  Negative for activity change, appetite change, fatigue and fever.   HENT:  Negative for nasal congestion, nosebleeds, postnasal drip, rhinorrhea, sinus pressure/congestion, sneezing and sore throat.    Eyes:  Negative for pain and itching.   Respiratory:  Negative for cough, chest tightness, shortness of breath, wheezing and stridor.    Cardiovascular:  Negative for chest pain.   Gastrointestinal:  Negative for abdominal pain.   Genitourinary:  Negative for dysuria.   Musculoskeletal:  Positive for back pain.   Neurological:  Negative for dizziness and headaches.   Psychiatric/Behavioral:  Negative for behavioral problems and confusion.           Objective     Physical Exam  Vitals and nursing note reviewed.   Constitutional:       Appearance: Normal appearance.   Cardiovascular:      Rate and Rhythm: Normal rate and regular rhythm.      Heart sounds: Normal heart sounds.   Pulmonary:      Effort: Pulmonary effort is normal.      Breath sounds: Normal breath sounds.   Musculoskeletal:         General: Normal range of motion.   Neurological:      Mental Status: He is alert and oriented to person, place, and time.   Psychiatric:         Mood and Affect: Mood normal.         Behavior: Behavior normal.            Assessment and Plan     1. Routine general medical examination at a health care facility    2. Hypertension, unspecified type  -     CBC Auto Differential; Future; Expected date: 03/21/2025  -     Comprehensive Metabolic Panel; Future; Expected date: 03/21/2025  -     Lipid Panel; Future; Expected date: 03/21/2025  -     TSH; Future; Expected date:  03/21/2025  -     amLODIPine (NORVASC) 10 MG tablet; Take 1 tablet (10 mg total) by mouth once daily.  Dispense: 90 tablet; Refill: 3  Increase amlodipine from 5mg daily to 10mg daily  Return to the clinic in 3 weeks for a nursing visit.     3. BMI 40.0-44.9, adult  Low sugar diet  Exercise 3-5 times weekly     4. Chronic midline low back pain without sciatica  -     Ambulatory referral/consult to Pain Clinic; Future; Expected date: 03/28/2025  Pt reports he has a pain stimulator pump in lower back that was placed several years ago and it is possibly time for the battery to be replaced.       Will call pt with lab results.          Follow up in about 3 weeks (around 4/11/2025).

## 2025-03-24 ENCOUNTER — PATIENT MESSAGE (OUTPATIENT)
Dept: FAMILY MEDICINE | Facility: CLINIC | Age: 40
End: 2025-03-24
Payer: COMMERCIAL

## 2025-04-11 ENCOUNTER — CLINICAL SUPPORT (OUTPATIENT)
Dept: FAMILY MEDICINE | Facility: CLINIC | Age: 40
End: 2025-04-11
Payer: COMMERCIAL

## 2025-04-11 ENCOUNTER — OFFICE VISIT (OUTPATIENT)
Dept: FAMILY MEDICINE | Facility: CLINIC | Age: 40
End: 2025-04-11
Payer: COMMERCIAL

## 2025-04-11 VITALS
BODY MASS INDEX: 41.03 KG/M2 | HEART RATE: 89 BPM | SYSTOLIC BLOOD PRESSURE: 148 MMHG | RESPIRATION RATE: 18 BRPM | TEMPERATURE: 98 F | OXYGEN SATURATION: 94 % | DIASTOLIC BLOOD PRESSURE: 90 MMHG | WEIGHT: 277 LBS | HEIGHT: 69 IN

## 2025-04-11 VITALS — SYSTOLIC BLOOD PRESSURE: 142 MMHG | DIASTOLIC BLOOD PRESSURE: 100 MMHG

## 2025-04-11 DIAGNOSIS — I10 HYPERTENSION, UNSPECIFIED TYPE: Primary | ICD-10-CM

## 2025-04-11 DIAGNOSIS — R52 BODY ACHES: ICD-10-CM

## 2025-04-11 DIAGNOSIS — G47.30 SLEEP APNEA, UNSPECIFIED TYPE: ICD-10-CM

## 2025-04-11 DIAGNOSIS — M62.838 MUSCLE SPASM: ICD-10-CM

## 2025-04-11 DIAGNOSIS — N32.89 BLADDER SPASMS: ICD-10-CM

## 2025-04-11 DIAGNOSIS — R20.2 TINGLING: Primary | ICD-10-CM

## 2025-04-11 LAB
ALBUMIN SERPL BCP-MCNC: 4 G/DL (ref 3.5–5)
ALBUMIN/GLOB SERPL: 0.8 {RATIO}
ALP SERPL-CCNC: 65 U/L (ref 40–150)
ALT SERPL W P-5'-P-CCNC: 40 U/L
ANION GAP SERPL CALCULATED.3IONS-SCNC: 11 MMOL/L (ref 7–16)
AST SERPL W P-5'-P-CCNC: 49 U/L (ref 11–45)
BASOPHILS # BLD AUTO: 0.03 K/UL (ref 0–0.2)
BASOPHILS NFR BLD AUTO: 0.4 % (ref 0–1)
BILIRUB SERPL-MCNC: 0.6 MG/DL
BUN SERPL-MCNC: 18 MG/DL (ref 9–21)
BUN/CREAT SERPL: 12 (ref 6–20)
CALCIUM SERPL-MCNC: 9.2 MG/DL (ref 8.4–10.2)
CHLORIDE SERPL-SCNC: 102 MMOL/L (ref 98–107)
CO2 SERPL-SCNC: 26 MMOL/L (ref 22–29)
CREAT SERPL-MCNC: 1.54 MG/DL (ref 0.72–1.25)
DIFFERENTIAL METHOD BLD: ABNORMAL
EGFR (NO RACE VARIABLE) (RUSH/TITUS): 58 ML/MIN/1.73M2
EOSINOPHIL # BLD AUTO: 0.22 K/UL (ref 0–0.5)
EOSINOPHIL NFR BLD AUTO: 2.8 % (ref 1–4)
ERYTHROCYTE [DISTWIDTH] IN BLOOD BY AUTOMATED COUNT: 12.4 % (ref 11.5–14.5)
GLOBULIN SER-MCNC: 5.1 G/DL (ref 2–4)
GLUCOSE SERPL-MCNC: 94 MG/DL (ref 74–100)
HCT VFR BLD AUTO: 44.7 % (ref 40–54)
HGB BLD-MCNC: 14.4 G/DL (ref 13.5–18)
IMM GRANULOCYTES # BLD AUTO: 0.02 K/UL (ref 0–0.04)
IMM GRANULOCYTES NFR BLD: 0.3 % (ref 0–0.4)
LYMPHOCYTES # BLD AUTO: 1.4 K/UL (ref 1–4.8)
LYMPHOCYTES NFR BLD AUTO: 17.9 % (ref 27–41)
MCH RBC QN AUTO: 30.5 PG (ref 27–31)
MCHC RBC AUTO-ENTMCNC: 32.2 G/DL (ref 32–36)
MCV RBC AUTO: 94.7 FL (ref 80–96)
MONOCYTES # BLD AUTO: 0.74 K/UL (ref 0–0.8)
MONOCYTES NFR BLD AUTO: 9.5 % (ref 2–6)
MPC BLD CALC-MCNC: 11.3 FL (ref 9.4–12.4)
NEUTROPHILS # BLD AUTO: 5.41 K/UL (ref 1.8–7.7)
NEUTROPHILS NFR BLD AUTO: 69.1 % (ref 53–65)
NRBC # BLD AUTO: 0 X10E3/UL
NRBC, AUTO (.00): 0 %
PLATELET # BLD AUTO: 300 K/UL (ref 150–400)
POTASSIUM SERPL-SCNC: 4 MMOL/L (ref 3.5–5.1)
PROT SERPL-MCNC: 9.1 G/DL (ref 6.4–8.3)
RBC # BLD AUTO: 4.72 M/UL (ref 4.6–6.2)
SODIUM SERPL-SCNC: 135 MMOL/L (ref 136–145)
TSH SERPL DL<=0.005 MIU/L-ACNC: 0.81 UIU/ML (ref 0.35–4.94)
VIT B12 SERPL-MCNC: 669 PG/ML (ref 213–816)
WBC # BLD AUTO: 7.82 K/UL (ref 4.5–11)

## 2025-04-11 PROCEDURE — 82607 VITAMIN B-12: CPT | Mod: ,,, | Performed by: CLINICAL MEDICAL LABORATORY

## 2025-04-11 PROCEDURE — 80050 GENERAL HEALTH PANEL: CPT | Mod: ,,, | Performed by: CLINICAL MEDICAL LABORATORY

## 2025-04-11 RX ORDER — KETOROLAC TROMETHAMINE 30 MG/ML
30 INJECTION, SOLUTION INTRAMUSCULAR; INTRAVENOUS
Status: COMPLETED | OUTPATIENT
Start: 2025-04-11 | End: 2025-04-11

## 2025-04-11 RX ORDER — DEXAMETHASONE SODIUM PHOSPHATE 4 MG/ML
4 INJECTION, SOLUTION INTRA-ARTICULAR; INTRALESIONAL; INTRAMUSCULAR; INTRAVENOUS; SOFT TISSUE
Status: COMPLETED | OUTPATIENT
Start: 2025-04-11 | End: 2025-04-11

## 2025-04-11 RX ORDER — TIZANIDINE 4 MG/1
4 TABLET ORAL 3 TIMES DAILY PRN
Qty: 20 TABLET | Refills: 0 | Status: SHIPPED | OUTPATIENT
Start: 2025-04-11 | End: 2025-04-21

## 2025-04-11 RX ORDER — PREDNISONE 20 MG/1
20 TABLET ORAL DAILY
Qty: 5 TABLET | Refills: 0 | Status: SHIPPED | OUTPATIENT
Start: 2025-04-11 | End: 2025-04-16

## 2025-04-11 RX ORDER — IBUPROFEN 600 MG/1
600 TABLET ORAL EVERY 6 HOURS PRN
Qty: 20 TABLET | Refills: 0 | Status: SHIPPED | OUTPATIENT
Start: 2025-04-11

## 2025-04-11 RX ORDER — LOSARTAN POTASSIUM 25 MG/1
25 TABLET ORAL DAILY
Qty: 90 TABLET | Refills: 1 | Status: SHIPPED | OUTPATIENT
Start: 2025-04-11

## 2025-04-11 RX ADMIN — DEXAMETHASONE SODIUM PHOSPHATE 4 MG: 4 INJECTION, SOLUTION INTRA-ARTICULAR; INTRALESIONAL; INTRAMUSCULAR; INTRAVENOUS; SOFT TISSUE at 02:04

## 2025-04-11 RX ADMIN — KETOROLAC TROMETHAMINE 30 MG: 30 INJECTION, SOLUTION INTRAMUSCULAR; INTRAVENOUS at 02:04

## 2025-04-11 NOTE — LETTER
April 11, 2025      [facility]  [address]  [place] [address]  Phone: [phone]  Fax: [phone]       Patient: Nikolay Gregorio   YOB: 1985  Date of Visit: 04/11/2025    To Whom It May Concern:    Ai Gregorio  was at [facility] on 04/11/2025. The patient may return to work/school on 04/14/2025 with no restrictions. If you have any questions or concerns, or if I can be of further assistance, please do not hesitate to contact me.    Sincerely,    Derek Fontenot II, DO

## 2025-04-11 NOTE — PROGRESS NOTES
Subjective:       Patient ID: Nikolay Gregorio is a 40 y.o. male.    Chief Complaint: Generalized Body Aches (Pain travels throughout body everyday. ) and Chest Pain (Right side rib pain when taking breath in. )    Patient reports generalized body aches, chest aching on inspiration, abdominal pain for a couple of years, reports episodes occur more frequently over past few weeks happening almost daily.     Chest Pain   Pertinent negatives include no abdominal pain, back pain, cough, diaphoresis, dizziness, fever, headaches, leg pain, nausea, numbness, palpitations, shortness of breath, vomiting or weakness.   Pertinent negatives for past medical history include no seizures.     Review of Systems   Constitutional:  Positive for fatigue. Negative for activity change, appetite change, chills, diaphoresis, fever and unexpected weight change.   HENT:  Negative for nasal congestion, dental problem, drooling, ear discharge, ear pain, facial swelling, hearing loss, mouth sores, nosebleeds, postnasal drip, rhinorrhea, sinus pressure/congestion, sneezing, sore throat, tinnitus, trouble swallowing, voice change and goiter.    Eyes:  Negative for photophobia, pain, discharge, redness, itching and visual disturbance.   Respiratory:  Negative for apnea, cough, choking, chest tightness, shortness of breath, wheezing and stridor.    Cardiovascular:  Positive for chest pain. Negative for palpitations, leg swelling and claudication.   Gastrointestinal:  Negative for abdominal distention, abdominal pain, anal bleeding, blood in stool, change in bowel habit, constipation, diarrhea, nausea, vomiting, reflux and fecal incontinence.   Endocrine: Negative for cold intolerance, heat intolerance, polydipsia, polyphagia and polyuria.   Genitourinary:  Negative for bladder incontinence, decreased urine volume, difficulty urinating, discharge, dysuria, enuresis, erectile dysfunction, flank pain, frequency, genital sores, hematuria, penile pain,  testicular pain and urgency.   Musculoskeletal:  Negative for arthralgias, back pain, gait problem, joint swelling, leg pain, myalgias, neck pain, neck stiffness and joint deformity.   Integumentary:  Negative for pallor, rash, wound and mole/lesion.   Allergic/Immunologic: Negative for environmental allergies, food allergies and frequent infections.   Neurological:  Negative for dizziness, vertigo, tremors, seizures, syncope, facial asymmetry, speech difficulty, weakness, light-headedness, numbness, headaches and coordination difficulties.   Hematological:  Negative for adenopathy. Does not bruise/bleed easily.   Psychiatric/Behavioral:  Negative for agitation, behavioral problems, confusion, decreased concentration, dysphoric mood, hallucinations, self-injury, sleep disturbance and suicidal ideas. The patient is not nervous/anxious and is not hyperactive.          Objective:      Physical Exam  Vitals reviewed.   Constitutional:       Appearance: Normal appearance. He is normal weight.   HENT:      Head: Normocephalic and atraumatic.      Right Ear: Tympanic membrane and ear canal normal.      Left Ear: Tympanic membrane, ear canal and external ear normal.      Nose: Nose normal.      Mouth/Throat:      Mouth: Mucous membranes are moist.      Pharynx: Oropharynx is clear.   Eyes:      Extraocular Movements: Extraocular movements intact.      Conjunctiva/sclera: Conjunctivae normal.      Pupils: Pupils are equal, round, and reactive to light.   Cardiovascular:      Rate and Rhythm: Normal rate and regular rhythm.      Pulses: Normal pulses.      Heart sounds: Normal heart sounds.   Pulmonary:      Effort: Pulmonary effort is normal.      Breath sounds: Normal breath sounds.   Abdominal:      General: Abdomen is flat. Bowel sounds are normal.      Palpations: Abdomen is soft.   Musculoskeletal:         General: Normal range of motion.      Cervical back: Normal range of motion and neck supple.   Skin:     General:  Skin is warm and dry.   Neurological:      General: No focal deficit present.      Mental Status: He is alert and oriented to person, place, and time. Mental status is at baseline.   Psychiatric:         Mood and Affect: Mood normal.         Behavior: Behavior normal.         Thought Content: Thought content normal.         Judgment: Judgment normal.         Assessment:       1. Tingling    2. Body aches    3. Sleep apnea, unspecified type    4. Muscle spasm    5. Bladder spasms        Plan:     Tingling  -     Comprehensive Metabolic Panel; Future; Expected date: 04/11/2025  -     CBC Auto Differential; Future; Expected date: 04/11/2025  -     TSH; Future; Expected date: 04/11/2025  -     Vitamin B12; Future; Expected date: 04/11/2025    Body aches  -     X-Ray Chest PA And Lateral; Future; Expected date: 04/11/2025  -     Ambulatory referral/consult to Neurology; Future; Expected date: 04/18/2025    Sleep apnea, unspecified type  -     Ambulatory referral/consult to Sleep Disorders; Future; Expected date: 04/18/2025    Muscle spasm  -     ketorolac injection 30 mg  -     dexAMETHasone injection 4 mg  -     predniSONE (DELTASONE) 20 MG tablet; Take 1 tablet (20 mg total) by mouth once daily. for 5 days  Dispense: 5 tablet; Refill: 0  -     tiZANidine (ZANAFLEX) 4 MG tablet; Take 1 tablet (4 mg total) by mouth 3 (three) times daily as needed (spasm).  Dispense: 20 tablet; Refill: 0  -     ibuprofen (ADVIL,MOTRIN) 600 MG tablet; Take 1 tablet (600 mg total) by mouth every 6 (six) hours as needed for Pain.  Dispense: 20 tablet; Refill: 0    Bladder spasms  -     Ambulatory referral/consult to Urology; Future; Expected date: 04/18/2025       Will get labs, will setup with neurology, will setup with rheumatology if no other evidence from labs or if neurology doesn't find anything, will treat for inflammation and muscle spasms.

## 2025-04-11 NOTE — PROGRESS NOTES
Pt bp 142/100. Losartan 25mg was added.   Was told to continue taking the amlodipine 10mg and RTC in 3 weeks for nurse to recheck bp.

## 2025-04-12 ENCOUNTER — RESULTS FOLLOW-UP (OUTPATIENT)
Dept: FAMILY MEDICINE | Facility: CLINIC | Age: 40
End: 2025-04-12

## 2025-04-14 ENCOUNTER — TELEPHONE (OUTPATIENT)
Dept: FAMILY MEDICINE | Facility: CLINIC | Age: 40
End: 2025-04-14
Payer: COMMERCIAL

## 2025-04-14 NOTE — TELEPHONE ENCOUNTER
PT EXTENDS SPECIAL THANKS TO DR. AYAD CHEUNG II.        ----- Message from Nurse Vela sent at 4/14/2025  2:02 PM CDT -----    ----- Message -----  From: Derek Cheung II, DO  Sent: 4/12/2025   6:34 PM CDT  To: [facility] Clinical Support S#    Labs at baseline, notify patient  ----- Message -----  From: Lab, Background User  Sent: 4/11/2025   7:04 PM CDT  To: Derek Cheung II, DO

## 2025-04-15 NOTE — PROGRESS NOTES
Subjective:         Patient ID: Nikolay Gregorio is a 40 y.o. male.    Chief Complaint: Back Pain (Legs, Knees down to the ankles and feet)      Pain  This is a chronic problem. The current episode started more than 1 year ago. The problem occurs daily. The problem has been waxing and waning. Associated symptoms include arthralgias. Pertinent negatives include no change in bowel habit, chest pain, chills, coughing, fever, sore throat, vertigo or vomiting.     Review of Systems   Constitutional:  Negative for activity change, chills, fever and unexpected weight change.   HENT:  Negative for drooling, ear discharge, ear pain, facial swelling, nosebleeds, sore throat, trouble swallowing, voice change and goiter.    Eyes:  Negative for photophobia, pain, discharge, redness and visual disturbance.   Respiratory:  Negative for apnea, cough, choking, chest tightness, shortness of breath, wheezing and stridor.    Cardiovascular:  Negative for chest pain, palpitations and leg swelling.   Gastrointestinal:  Negative for abdominal distention, change in bowel habit, diarrhea, vomiting and fecal incontinence.   Endocrine: Negative for cold intolerance, heat intolerance, polydipsia, polyphagia and polyuria.   Genitourinary:  Negative for bladder incontinence, dysuria, flank pain and frequency.   Musculoskeletal:  Positive for arthralgias, back pain and leg pain.   Integumentary:  Negative for color change and pallor.   Neurological:  Negative for dizziness, vertigo, seizures, syncope, facial asymmetry, speech difficulty, light-headedness, coordination difficulties and memory loss.   Hematological:  Negative for adenopathy. Does not bruise/bleed easily.   Psychiatric/Behavioral:  Negative for agitation, behavioral problems, confusion, decreased concentration, dysphoric mood, hallucinations and self-injury. The patient is not nervous/anxious and is not hyperactive.            Past Medical History:   Diagnosis Date    Essential  "(primary) hypertension     GERD (gastroesophageal reflux disease)     Incontinent of urine     Inflammatory disease of prostate, unspecified     Nonintractable headache 12/21/2024     Past Surgical History:   Procedure Laterality Date    intrastem stage 2 Right      Social History[1]  Family History   Problem Relation Name Age of Onset    Hypertension Mother      Multiple sclerosis Mother       Review of patient's allergies indicates:  No Known Allergies     Objective:  Vitals:    04/22/25 0907 04/22/25 0911   BP: 126/77    Pulse: 72    Weight: 127.5 kg (281 lb)    Height: 5' 9" (1.753 m)    PainSc:    8         Physical Exam  Vitals and nursing note reviewed. Exam conducted with a chaperone present.   Constitutional:       General: He is awake. He is not in acute distress.     Appearance: Normal appearance. He is not ill-appearing, toxic-appearing or diaphoretic.   HENT:      Head: Normocephalic and atraumatic.      Nose: Nose normal.      Mouth/Throat:      Mouth: Mucous membranes are moist.      Pharynx: Oropharynx is clear.   Eyes:      Conjunctiva/sclera: Conjunctivae normal.      Pupils: Pupils are equal, round, and reactive to light.   Cardiovascular:      Rate and Rhythm: Normal rate.   Pulmonary:      Effort: Pulmonary effort is normal. No respiratory distress.   Abdominal:      Palpations: Abdomen is soft.      Tenderness: There is no guarding.   Musculoskeletal:         General: Normal range of motion.      Cervical back: Normal range of motion and neck supple. No rigidity.   Skin:     General: Skin is warm and dry.      Coloration: Skin is not jaundiced or pale.   Neurological:      General: No focal deficit present.      Mental Status: He is alert and oriented to person, place, and time. Mental status is at baseline.      Cranial Nerves: No cranial nerve deficit (II-XII).   Psychiatric:         Mood and Affect: Mood normal.         Behavior: Behavior normal. Behavior is cooperative.         Thought " Content: Thought content normal.           X-Ray Chest PA And Lateral  Narrative: EXAMINATION:  XR CHEST PA AND LATERAL    CLINICAL HISTORY:  Pain, unspecified    TECHNIQUE:  PA and lateral chest    COMPARISON:  None.    FINDINGS:  The cardiac size is normal.  No infiltrates or effusions are seen.  Bony structures are within normal limits.  Impression: No evidence of acute disease.    Place of service: [facility]    Electronically signed by: Jeannine Holt  Date:    04/11/2025  Time:    14:36       Office Visit on 04/11/2025   Component Date Value Ref Range Status    Sodium 04/11/2025 135 (L)  136 - 145 mmol/L Final    Potassium 04/11/2025 4.0  3.5 - 5.1 mmol/L Final    Chloride 04/11/2025 102  98 - 107 mmol/L Final    CO2 04/11/2025 26  22 - 29 mmol/L Final    Anion Gap 04/11/2025 11  7 - 16 mmol/L Final    Glucose 04/11/2025 94  74 - 100 mg/dL Final    BUN 04/11/2025 18  9 - 21 mg/dL Final    Creatinine 04/11/2025 1.54 (H)  0.72 - 1.25 mg/dL Final    BUN/Creatinine Ratio 04/11/2025 12  6 - 20 Final    Calcium 04/11/2025 9.2  8.4 - 10.2 mg/dL Final    Total Protein 04/11/2025 9.1 (H)  6.4 - 8.3 g/dL Final    Albumin 04/11/2025 4.0  3.5 - 5.0 g/dL Final    Globulin 04/11/2025 5.1 (H)  2.0 - 4.0 g/dL Final    A/G Ratio 04/11/2025 0.8   Final    Bilirubin, Total 04/11/2025 0.6  <=1.5 mg/dL Final    Alk Phos 04/11/2025 65  40 - 150 U/L Final    ALT 04/11/2025 40  <=55 U/L Final    AST 04/11/2025 49 (H)  11 - 45 U/L Final    eGFR 04/11/2025 58 (L)  >=60 mL/min/1.73m2 Final    TSH 04/11/2025 0.815  0.350 - 4.940 uIU/mL Final    WBC 04/11/2025 7.82  4.50 - 11.00 K/uL Final    RBC 04/11/2025 4.72  4.60 - 6.20 M/uL Final    Hemoglobin 04/11/2025 14.4  13.5 - 18.0 g/dL Final    Hematocrit 04/11/2025 44.7  40.0 - 54.0 % Final    MCV 04/11/2025 94.7  80.0 - 96.0 fL Final    MCH 04/11/2025 30.5  27.0 - 31.0 pg Final    MCHC 04/11/2025 32.2  32.0 - 36.0 g/dL Final    RDW 04/11/2025 12.4  11.5 - 14.5 % Final     Platelet Count 04/11/2025 300  150 - 400 K/uL Final    MPV 04/11/2025 11.3  9.4 - 12.4 fL Final    Neutrophils % 04/11/2025 69.1 (H)  53.0 - 65.0 % Final    Lymphocytes % 04/11/2025 17.9 (L)  27.0 - 41.0 % Final    Monocytes % 04/11/2025 9.5 (H)  2.0 - 6.0 % Final    Eosinophils % 04/11/2025 2.8  1.0 - 4.0 % Final    Basophils % 04/11/2025 0.4  0.0 - 1.0 % Final    Immature Granulocytes % 04/11/2025 0.3  0.0 - 0.4 % Final    nRBC, Auto 04/11/2025 0.0  <=0.0 % Final    Neutrophils, Abs 04/11/2025 5.41  1.80 - 7.70 K/uL Final    Lymphocytes, Absolute 04/11/2025 1.40  1.00 - 4.80 K/uL Final    Monocytes, Absolute 04/11/2025 0.74  0.00 - 0.80 K/uL Final    Eosinophils, Absolute 04/11/2025 0.22  0.00 - 0.50 K/uL Final    Basophils, Absolute 04/11/2025 0.03  0.00 - 0.20 K/uL Final    Immature Granulocytes, Absolute 04/11/2025 0.02  0.00 - 0.04 K/uL Final    nRBC, Absolute 04/11/2025 0.00  <=0.00 x10e3/uL Final    Diff Type 04/11/2025 Auto   Final    Vitamin B12 04/11/2025 669  213 - 816 pg/mL Final   Office Visit on 03/21/2025   Component Date Value Ref Range Status    Sodium 03/21/2025 135 (L)  136 - 145 mmol/L Final    Potassium 03/21/2025 4.2  3.5 - 5.1 mmol/L Final    Chloride 03/21/2025 105  98 - 107 mmol/L Final    CO2 03/21/2025 24  22 - 29 mmol/L Final    Anion Gap 03/21/2025 10  7 - 16 mmol/L Final    Glucose 03/21/2025 95  74 - 100 mg/dL Final    BUN 03/21/2025 15  9 - 21 mg/dL Final    Creatinine 03/21/2025 1.50 (H)  0.72 - 1.25 mg/dL Final    BUN/Creatinine Ratio 03/21/2025 10  6 - 20 Final    Calcium 03/21/2025 9.5  8.4 - 10.2 mg/dL Final    Total Protein 03/21/2025 8.0  6.4 - 8.3 g/dL Final    Albumin 03/21/2025 3.8  3.5 - 5.0 g/dL Final    Globulin 03/21/2025 4.2 (H)  2.0 - 4.0 g/dL Final    A/G Ratio 03/21/2025 0.9   Final    Bilirubin, Total 03/21/2025 0.8  <=1.5 mg/dL Final    Alk Phos 03/21/2025 62  40 - 150 U/L Final    ALT 03/21/2025 28  <=55 U/L Final    AST 03/21/2025 38  11 - 45 U/L Final    eGFR  03/21/2025 60  >=60 mL/min/1.73m2 Final    Triglycerides 03/21/2025 105  34 - 140 mg/dL Final    Cholesterol 03/21/2025 180  <=200 mg/dL Final    HDL Cholesterol 03/21/2025 44  35 - 60 mg/dL Final    Cholesterol/HDL Ratio (Risk Factor) 03/21/2025 4.1   Final    Non-HDL 03/21/2025 136  mg/dL Final    LDL Calculated 03/21/2025 115  mg/dL Final    LDL/HDL 03/21/2025 2.6   Final    VLDL 03/21/2025 21  mg/dL Final    TSH 03/21/2025 2.168  0.350 - 4.940 uIU/mL Final    WBC 03/21/2025 6.91  4.50 - 11.00 K/uL Final    RBC 03/21/2025 4.40 (L)  4.60 - 6.20 M/uL Final    Hemoglobin 03/21/2025 13.3 (L)  13.5 - 18.0 g/dL Final    Hematocrit 03/21/2025 42.8  40.0 - 54.0 % Final    MCV 03/21/2025 97.3 (H)  80.0 - 96.0 fL Final    MCH 03/21/2025 30.2  27.0 - 31.0 pg Final    MCHC 03/21/2025 31.1 (L)  32.0 - 36.0 g/dL Final    RDW 03/21/2025 12.7  11.5 - 14.5 % Final    Platelet Count 03/21/2025 336  150 - 400 K/uL Final    MPV 03/21/2025 10.5  9.4 - 12.4 fL Final    Neutrophils % 03/21/2025 58.2  53.0 - 65.0 % Final    Lymphocytes % 03/21/2025 27.1  27.0 - 41.0 % Final    Monocytes % 03/21/2025 9.0 (H)  2.0 - 6.0 % Final    Eosinophils % 03/21/2025 4.2 (H)  1.0 - 4.0 % Final    Basophils % 03/21/2025 0.9  0.0 - 1.0 % Final    Immature Granulocytes % 03/21/2025 0.6 (H)  0.0 - 0.4 % Final    nRBC, Auto 03/21/2025 0.0  <=0.0 % Final    Neutrophils, Abs 03/21/2025 4.03  1.80 - 7.70 K/uL Final    Lymphocytes, Absolute 03/21/2025 1.87  1.00 - 4.80 K/uL Final    Monocytes, Absolute 03/21/2025 0.62  0.00 - 0.80 K/uL Final    Eosinophils, Absolute 03/21/2025 0.29  0.00 - 0.50 K/uL Final    Basophils, Absolute 03/21/2025 0.06  0.00 - 0.20 K/uL Final    Immature Granulocytes, Absolute 03/21/2025 0.04  0.00 - 0.04 K/uL Final    nRBC, Absolute 03/21/2025 0.00  <=0.00 x10e3/uL Final    Diff Type 03/21/2025 Auto   Final   Office Visit on 03/01/2025   Component Date Value Ref Range Status    Hemoglobin A1C 03/01/2025 4.9  <=7.0 % Final     Estimated Average Glucose 03/01/2025 94  mg/dL Final    Sodium 03/01/2025 136  136 - 145 mmol/L Final    Potassium 03/01/2025 4.5  3.5 - 5.1 mmol/L Final    Chloride 03/01/2025 106  98 - 107 mmol/L Final    CO2 03/01/2025 26  22 - 29 mmol/L Final    Anion Gap 03/01/2025 9  7 - 16 mmol/L Final    Glucose 03/01/2025 91  74 - 100 mg/dL Final    BUN 03/01/2025 15  9 - 21 mg/dL Final    Creatinine 03/01/2025 1.48 (H)  0.72 - 1.25 mg/dL Final    BUN/Creatinine Ratio 03/01/2025 10  6 - 20 Final    Calcium 03/01/2025 8.7  8.4 - 10.2 mg/dL Final    Total Protein 03/01/2025 8.0  6.4 - 8.3 g/dL Final    Albumin 03/01/2025 3.7  3.5 - 5.0 g/dL Final    Globulin 03/01/2025 4.3 (H)  2.0 - 4.0 g/dL Final    A/G Ratio 03/01/2025 0.9   Final    Bilirubin, Total 03/01/2025 0.8  <=1.5 mg/dL Final    Alk Phos 03/01/2025 65  40 - 150 U/L Final    ALT 03/01/2025 34  <=55 U/L Final    AST 03/01/2025 44 (H)  5 - 34 U/L Final    eGFR 03/01/2025 61  >=60 mL/min/1.73m2 Final    HIV 1/2 03/01/2025 Non-Reactive  Non-Reactive Final    Syphilis Ab Interpretation 03/01/2025 Non-Reactive  Non-Reactive Final    Chlamydia by PCR 03/01/2025 Negative  Negative, Invalid Final    N. gonorrhoeae (GC) by PCR 03/01/2025 Negative  Negative, Invalid Final    Trichomonas ABDOULAYE 03/01/2025 Negative  Negative Final    Hepatitis C Ab 03/01/2025 Non-Reactive  Non-Reactive Final    WBC 03/01/2025 6.03  4.50 - 11.00 K/uL Final    RBC 03/01/2025 4.35 (L)  4.60 - 6.20 M/uL Final    Hemoglobin 03/01/2025 13.3 (L)  13.5 - 18.0 g/dL Final    Hematocrit 03/01/2025 43.1  40.0 - 54.0 % Final    MCV 03/01/2025 99.1 (H)  80.0 - 96.0 fL Final    MCH 03/01/2025 30.6  27.0 - 31.0 pg Final    MCHC 03/01/2025 30.9 (L)  32.0 - 36.0 g/dL Final    RDW 03/01/2025 13.2  11.5 - 14.5 % Final    Platelet Count 03/01/2025 299  150 - 400 K/uL Final    MPV 03/01/2025 10.3  9.4 - 12.4 fL Final    Neutrophils % 03/01/2025 58.9  53.0 - 65.0 % Final    Lymphocytes % 03/01/2025 23.2 (L)  27.0 - 41.0  % Final    Monocytes % 03/01/2025 12.1 (H)  2.0 - 6.0 % Final    Eosinophils % 03/01/2025 4.3 (H)  1.0 - 4.0 % Final    Basophils % 03/01/2025 0.8  0.0 - 1.0 % Final    Immature Granulocytes % 03/01/2025 0.7 (H)  0.0 - 0.4 % Final    nRBC, Auto 03/01/2025 0.0  <=0.0 % Final    Neutrophils, Abs 03/01/2025 3.55  1.80 - 7.70 K/uL Final    Lymphocytes, Absolute 03/01/2025 1.40  1.00 - 4.80 K/uL Final    Monocytes, Absolute 03/01/2025 0.73  0.00 - 0.80 K/uL Final    Eosinophils, Absolute 03/01/2025 0.26  0.00 - 0.50 K/uL Final    Basophils, Absolute 03/01/2025 0.05  0.00 - 0.20 K/uL Final    Immature Granulocytes, Absolute 03/01/2025 0.04  0.00 - 0.04 K/uL Final    nRBC, Absolute 03/01/2025 0.00  <=0.00 x10e3/uL Final    Diff Type 03/01/2025 Auto   Final   Admission on 12/21/2024, Discharged on 12/21/2024   Component Date Value Ref Range Status    Sodium 12/21/2024 140  136 - 145 mmol/L Final    Potassium 12/21/2024 4.5  3.5 - 5.1 mmol/L Final    Chloride 12/21/2024 106  98 - 107 mmol/L Final    CO2 12/21/2024 26  22 - 29 mmol/L Final    Anion Gap 12/21/2024 13  7 - 16 mmol/L Final    Glucose 12/21/2024 105 (H)  74 - 100 mg/dL Final    BUN 12/21/2024 15  9 - 21 mg/dL Final    Creatinine 12/21/2024 1.29 (H)  0.72 - 1.25 mg/dL Final    BUN/Creatinine Ratio 12/21/2024 12  6 - 20 Final    Calcium 12/21/2024 8.9  8.4 - 10.2 mg/dL Final    eGFR 12/21/2024 72  >=60 mL/min/1.73m2 Final    WBC 12/21/2024 9.07  4.50 - 11.00 K/uL Final    RBC 12/21/2024 4.61  4.60 - 6.20 M/uL Final    Hemoglobin 12/21/2024 14.0  13.5 - 18.0 g/dL Final    Hematocrit 12/21/2024 44.7  40.0 - 54.0 % Final    MCV 12/21/2024 97.0 (H)  80.0 - 96.0 fL Final    MCH 12/21/2024 30.4  27.0 - 31.0 pg Final    MCHC 12/21/2024 31.3 (L)  32.0 - 36.0 g/dL Final    RDW 12/21/2024 12.9  11.5 - 14.5 % Final    Platelet Count 12/21/2024 289  150 - 400 K/uL Final    MPV 12/21/2024 10.1  9.4 - 12.4 fL Final    Neutrophils % 12/21/2024 76.6 (H)  53.0 - 65.0 % Final     Lymphocytes % 12/21/2024 13.9 (L)  27.0 - 41.0 % Final    Monocytes % 12/21/2024 8.3 (H)  2.0 - 6.0 % Final    Eosinophils % 12/21/2024 0.6 (L)  1.0 - 4.0 % Final    Basophils % 12/21/2024 0.4  0.0 - 1.0 % Final    Immature Granulocytes % 12/21/2024 0.2  0.0 - 0.4 % Final    nRBC, Auto 12/21/2024 0.0  <=0.0 % Final    Neutrophils, Abs 12/21/2024 6.95  1.80 - 7.70 K/uL Final    Lymphocytes, Absolute 12/21/2024 1.26  1.00 - 4.80 K/uL Final    Monocytes, Absolute 12/21/2024 0.75  0.00 - 0.80 K/uL Final    Eosinophils, Absolute 12/21/2024 0.05  0.00 - 0.50 K/uL Final    Basophils, Absolute 12/21/2024 0.04  0.00 - 0.20 K/uL Final    Immature Granulocytes, Absolute 12/21/2024 0.02  0.00 - 0.04 K/uL Final    nRBC, Absolute 12/21/2024 0.00  <=0.00 x10e3/uL Final    Diff Type 12/21/2024 Auto   Final    Barbiturates, Urine 12/21/2024 Negative  Negative Final    Benzodiazepine, Urine 12/21/2024 Negative  Negative Final    Opiates, Urine 12/21/2024 Negative  Negative Final    Phencyclidine, Urine 12/21/2024 Negative  Negative Final    Amphetamine, Urine 12/21/2024 Negative  Negative Final    Cannabinoid, Urine 12/21/2024 Negative  Negative Final    Cocaine, Urine 12/21/2024 Negative  Negative Final    Color, UA 12/21/2024 Light-Yellow  Colorless, Straw, Yellow, Light Yellow, Dark Yellow Final    Clarity, UA 12/21/2024 Turbid  Clear Final    pH, UA 12/21/2024 7.0  5.0 to 8.0 pH Units Final    Leukocytes, UA 12/21/2024 Large (A)  Negative Final    Nitrites, UA 12/21/2024 Negative  Negative Final    Protein, UA 12/21/2024 Negative  Negative Final    Glucose, UA 12/21/2024 Normal  Normal mg/dL Final    Ketones, UA 12/21/2024 Negative  Negative mg/dL Final    Urobilinogen, UA 12/21/2024 Normal  0.2, 1.0, Normal mg/dL Final    Bilirubin, UA 12/21/2024 Negative  Negative Final    Blood, UA 12/21/2024 Negative  Negative Final    Specific Gravity, UA 12/21/2024 1.009  <=1.030 Final    WBC, UA 12/21/2024 84 (H)  <=5 /hpf Final    RBC, UA  12/21/2024 3  <=3 /hpf Final    Bacteria, UA 12/21/2024 Few (A)  None Seen /hpf Final    Squamous Epithelial Cells, UA 12/21/2024 Occasional (A)  None Seen /HPF Final    Mucous 12/21/2024 Occasional (A)  None Seen /LPF Final    Culture, Urine 12/21/2024 >100,000 Streptococcus agalactiae (Group B) (A)   Final         Orders Placed This Encounter   Procedures    MRI Lumbar Spine Without Contrast     Standing Status:   Future     Expected Date:   4/22/2025     Expiration Date:   4/22/2026     Does the patient have or ever had a pacemaker or a defibrillator (Note: Some facilities may not be able to schedule an MRI for patients with pacemakers and defibrillators. You should contact your local radiology dept to determine if this is the case.)?:   Yes              Bladder stimulator     Does the patient have an aneurysm or surgical clip, pump, nerve/brain stimulator, middle/inner ear prosthesis, or other metal implant or foreign object (bullet, shrapnel)? If they have a card related to their implant, ask them to bring it. Issues related to the implant may cause the MRI to be delayed.:   No     Is the patient claustrophobic?:   No     Will the patient require po anxiolysis or sedation?:   No     Does the patient have any of the following conditions? Diabetes, History of Renal Disease or Hypertension requiring medical therapy?:   No     May the Radiologist modify the order per protocol to meet the clinical needs of the patient?:   Yes     Is this part of a Research Study?:   No     Recist criteria?:   No     Will this service be billed to a Worker's Comp policy?:   No     Does the patient have on a skin patch for medication with aluminized backing?:   No    X-Ray Lumbar Spine 2 Or 3 Views     Standing Status:   Future     Expected Date:   4/22/2025     Expiration Date:   7/22/2025     May the Radiologist modify the order per protocol to meet the clinical needs of the patient?:   Yes     Does the patient have a neck collar or  brace on?:   No    X-Ray Hips Bilateral 2 View Inc AP Pelvis     Standing Status:   Future     Expected Date:   4/22/2025     Expiration Date:   4/22/2026     Does the patient have a splint or a brace?:   No     Does the patient have a cast?:   No     May the Radiologist modify the order per protocol to meet the clinical needs of the patient?:   Yes     Release to patient:   Immediate    X-Ray Knee 1 or 2 View Right     Standing Status:   Future     Expected Date:   4/22/2025     Expiration Date:   4/22/2026     May the Radiologist modify the order per protocol to meet the clinical needs of the patient?:   Yes     Release to patient:   Immediate    Controlled Substance Monitoring Panel, Random, Urine     Standing Status:   Future     Number of Occurrences:   1     Expected Date:   4/22/2025     Expiration Date:   6/21/2026     Send normal result to authorizing provider's In Basket if patient is active on MyChart::   Yes    POCT Urine Drug Screen Presump     Interpretive Information:     Negative:  No drug detected at the cut off level.   Positive:  This result represents presumptive positive for the   tested drug, other substances may yield a positive response other   than the analyte of interest. This result should be utilized for   diagnostic purpose only. Confirmation testing will be performed upon physician request only.          Requested Prescriptions     Signed Prescriptions Disp Refills    gabapentin (NEURONTIN) 100 MG capsule 90 capsule 0     Sig: Take 1 capsule (100 mg total) by mouth every 8 (eight) hours.       Assessment:     1. Lumbar radiculopathy, chronic    2. Chronic midline low back pain without sciatica    3. Encounter for long-term (current) use of medications    4. Dorsalgia, unspecified    5. Chronic pain of right knee         A's of Opioid Risk Assessment  Activity:  Current medication helps perform ADL.   Analgesia:Patients pain is partially controlled by current medication. Patient has  tried OTC medications such as Tylenol and Ibuprofen with out relief.   Adverse Effects: Patient denies constipation or sedation.  Aberrant Behavior:  reviewed with no aberrant drug seeking/taking behavior.  Overdose reversal drug naloxone discussed    Drug screen reviewed      Plan:    New patient     [facility]    Acacia Melgoza NP    Back pain  Bladder stimulator 2023  Dr. Ronnell rodríguez  MRI conditional [employer] card information under media    Nothing on  April 15, 2025    Labs through April 11, 2025 reviewed    CT cervical spine [facility] December 8, 2017  No acute abnormality of the cervical spine    No current x-ray/MRI for review      April 22, 2025     Patient presents clinic today with a multiple year history back pain buttock and leg pain numbness and tingling right greater than left primarily right leg worse with standing or walking increased activity better with short periods resting he states with increased activity further he walks he does have some severe sharp stabbing pain     Also has pain right lower back at the site of his generator which is just at the belt line it is very superficial he states anything that touches or moves his stimulator such as his belt or clothing it causes severe pain at the site he describes an electrical sensation around the bladder stimulator generator    Denies loss of bowel or bladder function     Neurologically intact     Patient would like to proceed with bladder stimulator generator revision will try to locate provider locally    Will order x-rays lumbar spine pelvis and hips right knee    MRI lumbar spine no contrast lumbar radiculopathy    MRI for consideration procedure/surgery    I have given the patient medically directed home exercise program    Patient has tried NSAIDs and neuromodulators (neurontin, lyrica, elavil, or cymbalta)    Patient requesting Toradol injection     Toradol 60 mg IM, tolerated well     Gabapentin 100 mg 1  p.o. q.8 hours    Follow-up after above studies discuss options    Dr. Geiger    Bring original prescription medication bottles/container/box with labels to each visit    Greater than 30 minutes spent on this encounter including 10 minutes reviewing imaging and notes, 15 minutes with the patient, 5 minutes documentation                [1]   Social History  Socioeconomic History    Marital status: Legally    Tobacco Use    Smoking status: Never    Smokeless tobacco: Never   Substance and Sexual Activity    Alcohol use: Yes     Alcohol/week: 4.0 standard drinks of alcohol     Types: 2 Cans of beer, 2 Shots of liquor per week     Comment: occasionally    Drug use: Never    Sexual activity: Yes     Partners: Female     Birth control/protection: Condom

## 2025-04-22 ENCOUNTER — HOSPITAL ENCOUNTER (OUTPATIENT)
Dept: RADIOLOGY | Facility: HOSPITAL | Age: 40
Discharge: HOME | End: 2025-04-22
Attending: PHYSICIAN ASSISTANT
Payer: COMMERCIAL

## 2025-04-22 ENCOUNTER — OFFICE VISIT (OUTPATIENT)
Dept: PAIN MEDICINE | Facility: CLINIC | Age: 40
End: 2025-04-22
Payer: COMMERCIAL

## 2025-04-22 VITALS
DIASTOLIC BLOOD PRESSURE: 77 MMHG | BODY MASS INDEX: 41.62 KG/M2 | SYSTOLIC BLOOD PRESSURE: 126 MMHG | WEIGHT: 281 LBS | HEIGHT: 69 IN | HEART RATE: 72 BPM

## 2025-04-22 DIAGNOSIS — M54.9 DORSALGIA, UNSPECIFIED: ICD-10-CM

## 2025-04-22 DIAGNOSIS — G89.29 CHRONIC MIDLINE LOW BACK PAIN WITHOUT SCIATICA: ICD-10-CM

## 2025-04-22 DIAGNOSIS — Z79.899 ENCOUNTER FOR LONG-TERM (CURRENT) USE OF MEDICATIONS: ICD-10-CM

## 2025-04-22 DIAGNOSIS — M54.50 CHRONIC MIDLINE LOW BACK PAIN WITHOUT SCIATICA: ICD-10-CM

## 2025-04-22 DIAGNOSIS — M25.561 CHRONIC PAIN OF RIGHT KNEE: Chronic | ICD-10-CM

## 2025-04-22 DIAGNOSIS — G89.29 CHRONIC PAIN OF RIGHT KNEE: Chronic | ICD-10-CM

## 2025-04-22 DIAGNOSIS — M54.16 LUMBAR RADICULOPATHY, CHRONIC: Primary | Chronic | ICD-10-CM

## 2025-04-22 DIAGNOSIS — M54.16 LUMBAR RADICULOPATHY, CHRONIC: Chronic | ICD-10-CM

## 2025-04-22 LAB

## 2025-04-22 PROCEDURE — 73560 X-RAY EXAM OF KNEE 1 OR 2: CPT | Mod: 26,RT,, | Performed by: RADIOLOGY

## 2025-04-22 PROCEDURE — 3078F DIAST BP <80 MM HG: CPT | Mod: CPTII,,, | Performed by: PHYSICIAN ASSISTANT

## 2025-04-22 PROCEDURE — 4010F ACE/ARB THERAPY RXD/TAKEN: CPT | Mod: CPTII,,, | Performed by: PHYSICIAN ASSISTANT

## 2025-04-22 PROCEDURE — 96372 THER/PROPH/DIAG INJ SC/IM: CPT | Mod: PBBFAC | Performed by: PHYSICIAN ASSISTANT

## 2025-04-22 PROCEDURE — 72100 X-RAY EXAM L-S SPINE 2/3 VWS: CPT | Mod: TC

## 2025-04-22 PROCEDURE — 1159F MED LIST DOCD IN RCRD: CPT | Mod: CPTII,,, | Performed by: PHYSICIAN ASSISTANT

## 2025-04-22 PROCEDURE — 99999 PR PBB SHADOW E&M-EST. PATIENT-LVL V: CPT | Mod: PBBFAC,,, | Performed by: PHYSICIAN ASSISTANT

## 2025-04-22 PROCEDURE — 72100 X-RAY EXAM L-S SPINE 2/3 VWS: CPT | Mod: 26,,, | Performed by: RADIOLOGY

## 2025-04-22 PROCEDURE — 99215 OFFICE O/P EST HI 40 MIN: CPT | Mod: PBBFAC | Performed by: PHYSICIAN ASSISTANT

## 2025-04-22 PROCEDURE — 99999PBSHW PR PBB SHADOW TECHNICAL ONLY FILED TO HB: Mod: PBBFAC,JZ,,

## 2025-04-22 PROCEDURE — 73560 X-RAY EXAM OF KNEE 1 OR 2: CPT | Mod: TC,RT

## 2025-04-22 PROCEDURE — 99999PBSHW POCT URINE DRUG SCREEN PRESUMP: Mod: PBBFAC,,,

## 2025-04-22 PROCEDURE — 3008F BODY MASS INDEX DOCD: CPT | Mod: CPTII,,, | Performed by: PHYSICIAN ASSISTANT

## 2025-04-22 PROCEDURE — 3044F HG A1C LEVEL LT 7.0%: CPT | Mod: CPTII,,, | Performed by: PHYSICIAN ASSISTANT

## 2025-04-22 PROCEDURE — 99204 OFFICE O/P NEW MOD 45 MIN: CPT | Mod: S$PBB,25,, | Performed by: PHYSICIAN ASSISTANT

## 2025-04-22 PROCEDURE — 80305 DRUG TEST PRSMV DIR OPT OBS: CPT | Mod: PBBFAC | Performed by: PHYSICIAN ASSISTANT

## 2025-04-22 PROCEDURE — 3074F SYST BP LT 130 MM HG: CPT | Mod: CPTII,,, | Performed by: PHYSICIAN ASSISTANT

## 2025-04-22 PROCEDURE — 73522 X-RAY EXAM HIPS BI 3-4 VIEWS: CPT | Mod: TC

## 2025-04-22 RX ORDER — KETOROLAC TROMETHAMINE 30 MG/ML
60 INJECTION, SOLUTION INTRAMUSCULAR; INTRAVENOUS
Status: COMPLETED | OUTPATIENT
Start: 2025-04-22 | End: 2025-04-22

## 2025-04-22 RX ORDER — GABAPENTIN 100 MG/1
100 CAPSULE ORAL EVERY 8 HOURS
Qty: 90 CAPSULE | Refills: 0 | Status: SHIPPED | OUTPATIENT
Start: 2025-04-22 | End: 2025-05-22

## 2025-04-22 RX ADMIN — KETOROLAC TROMETHAMINE 60 MG: 30 INJECTION, SOLUTION INTRAMUSCULAR at 09:04

## 2025-05-12 ENCOUNTER — OFFICE VISIT (OUTPATIENT)
Dept: FAMILY MEDICINE | Facility: CLINIC | Age: 40
End: 2025-05-12
Payer: COMMERCIAL

## 2025-05-12 VITALS
TEMPERATURE: 98 F | HEART RATE: 70 BPM | BODY MASS INDEX: 42.36 KG/M2 | SYSTOLIC BLOOD PRESSURE: 116 MMHG | HEIGHT: 69 IN | RESPIRATION RATE: 19 BRPM | DIASTOLIC BLOOD PRESSURE: 88 MMHG | OXYGEN SATURATION: 96 % | WEIGHT: 286 LBS

## 2025-05-12 DIAGNOSIS — N48.1 BALANITIS: ICD-10-CM

## 2025-05-12 DIAGNOSIS — R36.9 PENILE DISCHARGE: ICD-10-CM

## 2025-05-12 DIAGNOSIS — B00.9 HSV (HERPES SIMPLEX VIRUS) INFECTION: ICD-10-CM

## 2025-05-12 DIAGNOSIS — Z11.3 SCREENING EXAMINATION FOR VENEREAL DISEASE: Primary | ICD-10-CM

## 2025-05-12 LAB
BILIRUB SERPL-MCNC: NEGATIVE MG/DL
BLOOD, POC UA: NEGATIVE
GLUCOSE UR QL STRIP: NEGATIVE
HIV 1+O+2 AB SERPL QL: NORMAL
KETONES UR QL STRIP: NEGATIVE
LEUKOCYTE ESTERASE URINE, POC: ABNORMAL
NITRITE, POC UA: NEGATIVE
PH, POC UA: 5.5
PROTEIN, POC: NEGATIVE
SPECIFIC GRAVITY, POC UA: 1.02
SYPHILIS AB INTERPRETATION: NORMAL
UROBILINOGEN, POC UA: 0.2

## 2025-05-12 PROCEDURE — 4010F ACE/ARB THERAPY RXD/TAKEN: CPT | Mod: CPTII,,, | Performed by: NURSE PRACTITIONER

## 2025-05-12 PROCEDURE — 87591 N.GONORRHOEAE DNA AMP PROB: CPT | Mod: ,,, | Performed by: CLINICAL MEDICAL LABORATORY

## 2025-05-12 PROCEDURE — 81003 URINALYSIS AUTO W/O SCOPE: CPT | Mod: QW,,, | Performed by: NURSE PRACTITIONER

## 2025-05-12 PROCEDURE — 3008F BODY MASS INDEX DOCD: CPT | Mod: CPTII,,, | Performed by: NURSE PRACTITIONER

## 2025-05-12 PROCEDURE — 3074F SYST BP LT 130 MM HG: CPT | Mod: CPTII,,, | Performed by: NURSE PRACTITIONER

## 2025-05-12 PROCEDURE — 3079F DIAST BP 80-89 MM HG: CPT | Mod: CPTII,,, | Performed by: NURSE PRACTITIONER

## 2025-05-12 PROCEDURE — 99214 OFFICE O/P EST MOD 30 MIN: CPT | Mod: ,,, | Performed by: NURSE PRACTITIONER

## 2025-05-12 PROCEDURE — 87661 TRICHOMONAS VAGINALIS AMPLIF: CPT | Mod: ,,, | Performed by: CLINICAL MEDICAL LABORATORY

## 2025-05-12 PROCEDURE — 87389 HIV-1 AG W/HIV-1&-2 AB AG IA: CPT | Mod: ,,, | Performed by: CLINICAL MEDICAL LABORATORY

## 2025-05-12 PROCEDURE — 86695 HERPES SIMPLEX TYPE 1 TEST: CPT | Mod: ,,, | Performed by: CLINICAL MEDICAL LABORATORY

## 2025-05-12 PROCEDURE — 86696 HERPES SIMPLEX TYPE 2 TEST: CPT | Mod: ,,, | Performed by: CLINICAL MEDICAL LABORATORY

## 2025-05-12 PROCEDURE — 86780 TREPONEMA PALLIDUM: CPT | Mod: ,,, | Performed by: CLINICAL MEDICAL LABORATORY

## 2025-05-12 PROCEDURE — 1160F RVW MEDS BY RX/DR IN RCRD: CPT | Mod: CPTII,,, | Performed by: NURSE PRACTITIONER

## 2025-05-12 PROCEDURE — 86694 HERPES SIMPLEX NES ANTBDY: CPT | Mod: ,,, | Performed by: CLINICAL MEDICAL LABORATORY

## 2025-05-12 PROCEDURE — 3044F HG A1C LEVEL LT 7.0%: CPT | Mod: CPTII,,, | Performed by: NURSE PRACTITIONER

## 2025-05-12 PROCEDURE — 1159F MED LIST DOCD IN RCRD: CPT | Mod: CPTII,,, | Performed by: NURSE PRACTITIONER

## 2025-05-12 PROCEDURE — 87491 CHLMYD TRACH DNA AMP PROBE: CPT | Mod: ,,, | Performed by: CLINICAL MEDICAL LABORATORY

## 2025-05-12 RX ORDER — NYSTATIN 100000 U/G
CREAM TOPICAL 2 TIMES DAILY
Qty: 30 G | Refills: 0 | Status: SHIPPED | OUTPATIENT
Start: 2025-05-12

## 2025-05-12 NOTE — PROGRESS NOTES
Subjective:       Patient ID: Nikolay Gregorio is a 40 y.o. male.    Chief Complaint: Penile Discharge (W/ itching; started a week ago; white discharge)    White penile discharge and penile rash    Penile Discharge  The patient's primary symptoms include penile discharge. Pertinent negatives include no chills, dysuria, fever, flank pain, frequency or urgency.     Review of Systems   Constitutional:  Negative for chills, fatigue and fever.   Genitourinary:  Positive for discharge and genital sores. Negative for dysuria, flank pain, frequency and urgency.         Objective:      Physical Exam  Vitals and nursing note reviewed. Exam conducted with a chaperone present.   Constitutional:       General: He is not in acute distress.     Appearance: Normal appearance. He is not ill-appearing, toxic-appearing or diaphoretic.   HENT:      Head: Normocephalic.   Eyes:      General: No scleral icterus.     Extraocular Movements: Extraocular movements intact.      Pupils: Pupils are equal, round, and reactive to light.   Cardiovascular:      Rate and Rhythm: Normal rate and regular rhythm.      Pulses: Normal pulses.      Heart sounds: Normal heart sounds. No murmur heard.  Pulmonary:      Effort: Pulmonary effort is normal. No respiratory distress.      Breath sounds: Normal breath sounds. No wheezing, rhonchi or rales.   Genitourinary:     Penis: Erythema, discharge and lesions present.       Comments: Open erythematous lesions noted on the shaft penis with a thick white discharge  Musculoskeletal:         General: Normal range of motion.      Cervical back: Neck supple. No tenderness.   Lymphadenopathy:      Cervical: No cervical adenopathy.   Skin:     General: Skin is warm and dry.      Capillary Refill: Capillary refill takes less than 2 seconds.      Findings: No rash.   Neurological:      Mental Status: He is alert and oriented to person, place, and time.   Psychiatric:         Mood and Affect: Mood normal.          Behavior: Behavior normal.         Thought Content: Thought content normal.         Judgment: Judgment normal.            Assessment:       1. Screening examination for venereal disease    2. Penile discharge    3. Balanitis    4. HSV (herpes simplex virus) infection        Plan:   Screening examination for venereal disease  -     HIV 1/2 Ag/Ab (4th Gen); Future; Expected date: 05/12/2025  -     Herpes simplex type 1&2 IgG,Herpes titer; Future; Expected date: 05/12/2025  -     Herpes simplex type 1 & 2 IgM,Herpes IgM; Future; Expected date: 05/12/2025  -     Syphilis Antibody with reflex to RPR; Future; Expected date: 05/12/2025    Penile discharge  -     POCT Urinalysis  -     Chlamydia/GC, PCR  -     Trichomonas vaginalis by PCR; Future; Expected date: 05/12/2025  -     HIV 1/2 Ag/Ab (4th Gen); Future; Expected date: 05/12/2025  -     Herpes simplex type 1&2 IgG,Herpes titer; Future; Expected date: 05/12/2025  -     Herpes simplex type 1 & 2 IgM,Herpes IgM; Future; Expected date: 05/12/2025  -     Syphilis Antibody with reflex to RPR; Future; Expected date: 05/12/2025    Balanitis  -     nystatin (MYCOSTATIN) cream; Apply topically 2 (two) times daily.  Dispense: 30 g; Refill: 0    HSV (herpes simplex virus) infection  -     valACYclovir (VALTREX) 1000 MG tablet; Take 1 tablet (1,000 mg total) by mouth 3 (three) times daily. for 7 days  Dispense: 21 tablet; Refill: 0           Risks, benefits, and side effects were discussed with the patient. All questions were answered to the fullest satisfaction of the patient, and pt verbalized understanding and agreement to treatment plan. Pt was to call with any new or worsening symptoms, or present to the ER

## 2025-05-13 ENCOUNTER — TELEPHONE (OUTPATIENT)
Dept: FAMILY MEDICINE | Facility: CLINIC | Age: 40
End: 2025-05-13
Payer: COMMERCIAL

## 2025-05-13 ENCOUNTER — RESULTS FOLLOW-UP (OUTPATIENT)
Dept: FAMILY MEDICINE | Facility: CLINIC | Age: 40
End: 2025-05-13

## 2025-05-13 LAB
CHLAMYDIA BY PCR: NEGATIVE
HSV IGM SER QL IA: NEGATIVE
N. GONORRHOEAE (GC) BY PCR: NEGATIVE
TRICHOMONAS NAT: NEGATIVE

## 2025-05-13 NOTE — TELEPHONE ENCOUNTER
----- Message from TIFFANY Gibbons sent at 5/13/2025 12:58 PM CDT -----  Please notify pt his labs are negative  ----- Message -----  From: Emerald Torres  Sent: 5/12/2025   9:11 AM CDT  To: TIFFANY Rose

## 2025-05-13 NOTE — TELEPHONE ENCOUNTER
DOBC, PT REQUEST MORE INFO PER NP.       ----- Message from TIFFANY Gibbons sent at 5/13/2025  7:32 AM CDT -----  Please notify pt of std results- negative  ----- Message -----  From: Emerald Torres  Sent: 5/12/2025   9:11 AM CDT  To: TIFFANY Rose

## 2025-05-14 LAB
HSV TYPE 1 AB IGG INDEX: 8.76
HSV TYPE 2 AB IGG INDEX: 6.51
HSV1 IGG SER QL: POSITIVE
HSV2 IGG SER QL: POSITIVE

## 2025-05-14 RX ORDER — VALACYCLOVIR HYDROCHLORIDE 1 G/1
1000 TABLET, FILM COATED ORAL 3 TIMES DAILY
Qty: 21 TABLET | Refills: 0 | Status: SHIPPED | OUTPATIENT
Start: 2025-05-14 | End: 2025-05-21

## 2025-05-14 NOTE — ADDENDUM NOTE
Addended by: MARTHA ZHAO on: 5/14/2025 08:30 AM     Modules accepted: Orders    
You can access the FollowMyHealth Patient Portal offered by [facility] by registering at the following website: [email or web address]. By joining [facility]’s FollowMyHealth portal, you will also be able to view your health information using other applications (apps) compatible with our system.

## 2025-05-18 DIAGNOSIS — M25.561 CHRONIC PAIN OF RIGHT KNEE: Chronic | ICD-10-CM

## 2025-05-18 DIAGNOSIS — G89.29 CHRONIC PAIN OF RIGHT KNEE: Chronic | ICD-10-CM

## 2025-05-18 DIAGNOSIS — M54.16 LUMBAR RADICULOPATHY, CHRONIC: Chronic | ICD-10-CM

## 2025-05-19 RX ORDER — GABAPENTIN 100 MG/1
100 CAPSULE ORAL EVERY 8 HOURS
Qty: 90 CAPSULE | Refills: 0 | Status: SHIPPED | OUTPATIENT
Start: 2025-05-19

## 2025-05-24 ENCOUNTER — HOSPITAL ENCOUNTER (EMERGENCY)
Facility: HOSPITAL | Age: 40
Discharge: HOME | End: 2025-05-24
Payer: COMMERCIAL

## 2025-05-24 VITALS
HEART RATE: 88 BPM | DIASTOLIC BLOOD PRESSURE: 100 MMHG | RESPIRATION RATE: 16 BRPM | SYSTOLIC BLOOD PRESSURE: 157 MMHG | OXYGEN SATURATION: 96 % | TEMPERATURE: 98 F | HEIGHT: 69 IN | BODY MASS INDEX: 42.21 KG/M2 | WEIGHT: 285 LBS

## 2025-05-24 DIAGNOSIS — M54.50 LOW BACK PAIN, UNSPECIFIED BACK PAIN LATERALITY, UNSPECIFIED CHRONICITY, UNSPECIFIED WHETHER SCIATICA PRESENT: Primary | ICD-10-CM

## 2025-05-24 PROCEDURE — 25000003 PHARM REV CODE 250: Performed by: NURSE PRACTITIONER

## 2025-05-24 PROCEDURE — 99283 EMERGENCY DEPT VISIT LOW MDM: CPT

## 2025-05-24 RX ORDER — KETOROLAC TROMETHAMINE 10 MG/1
10 TABLET, FILM COATED ORAL
Status: COMPLETED | OUTPATIENT
Start: 2025-05-24 | End: 2025-05-24

## 2025-05-24 RX ORDER — DICLOFENAC SODIUM 10 MG/G
2 GEL TOPICAL 4 TIMES DAILY
Qty: 20 G | Refills: 0 | Status: SHIPPED | OUTPATIENT
Start: 2025-05-24

## 2025-05-24 RX ADMIN — KETOROLAC TROMETHAMINE 10 MG: 10 TABLET, FILM COATED ORAL at 10:05

## 2025-05-24 NOTE — Clinical Note
"Nikolay Washingtonjules Gregorio was seen and treated in our emergency department on 5/24/2025.  He may return to work on 05/26/2025.       If you have any questions or concerns, please don't hesitate to call.      Ximena Inman FNP"

## 2025-05-25 NOTE — DISCHARGE INSTRUCTIONS
Use prescriptions as directed. Alternate Tylenol and Ibuprofen as needed for pain. Follow up with your provider this week for recheck and continued care and management. Return to the ED for worsening signs and symptoms or otherwise as needed.

## 2025-05-26 ENCOUNTER — HOSPITAL ENCOUNTER (EMERGENCY)
Facility: HOSPITAL | Age: 40
Discharge: HOME | End: 2025-05-26
Payer: COMMERCIAL

## 2025-05-26 VITALS
RESPIRATION RATE: 16 BRPM | SYSTOLIC BLOOD PRESSURE: 146 MMHG | TEMPERATURE: 98 F | HEIGHT: 69 IN | HEART RATE: 82 BPM | OXYGEN SATURATION: 97 % | WEIGHT: 285 LBS | DIASTOLIC BLOOD PRESSURE: 93 MMHG | BODY MASS INDEX: 42.21 KG/M2

## 2025-05-26 DIAGNOSIS — M54.9 BACK PAIN, UNSPECIFIED BACK LOCATION, UNSPECIFIED BACK PAIN LATERALITY, UNSPECIFIED CHRONICITY: Primary | ICD-10-CM

## 2025-05-26 PROCEDURE — 99283 EMERGENCY DEPT VISIT LOW MDM: CPT

## 2025-05-26 RX ORDER — METHOCARBAMOL 500 MG/1
1000 TABLET, FILM COATED ORAL 3 TIMES DAILY
Qty: 30 TABLET | Refills: 0 | Status: SHIPPED | OUTPATIENT
Start: 2025-05-26 | End: 2025-05-31

## 2025-05-26 NOTE — ED PROVIDER NOTES
Encounter Date: 5/26/2025       History     Chief Complaint   Patient presents with    Back Pain     Pt presents to ed with c/o having chronic back pain and states he just can not work due to his stimulator      40-year-old male presents to the emergency department to be evaluated for pain in his right lower back over his stimulator.  He reports he had a stimulator inserted for his back pain 3 years ago.  Denies any recent fall or injury, numbness or weakness in his lower extremities, dysuria.  He said that he really just needs a work excuse because he is unable to do his work due to the pain that he has over his stimulator when he wears his pants over that area.    The history is provided by the patient.   Back Pain   This is a new problem. The pain is associated with no known injury. Pertinent negatives include no chest pain, no fever, no numbness, no weight loss, no headaches, no abdominal pain, no abdominal swelling, no bowel incontinence, no perianal numbness, no bladder incontinence, no dysuria, no pelvic pain, no leg pain, no paresthesias, no paresis, no tingling and no weakness.     Review of patient's allergies indicates:  No Known Allergies  Past Medical History:   Diagnosis Date    Essential (primary) hypertension     GERD (gastroesophageal reflux disease)     Incontinent of urine     Inflammatory disease of prostate, unspecified     Nonintractable headache 12/21/2024     Past Surgical History:   Procedure Laterality Date    intrastem stage 2 Right      Family History   Problem Relation Name Age of Onset    Hypertension Mother      Multiple sclerosis Mother       Social History[1]  Review of Systems   Constitutional:  Negative for fever and weight loss.   Cardiovascular:  Negative for chest pain.   Gastrointestinal:  Negative for abdominal pain and bowel incontinence.   Genitourinary:  Negative for bladder incontinence, dysuria and pelvic pain.   Musculoskeletal:  Positive for back pain.   Neurological:   Negative for tingling, weakness, numbness, headaches and paresthesias.       Physical Exam     Initial Vitals [05/26/25 1053]   BP Pulse Resp Temp SpO2   (!) 146/93 82 16 98.1 °F (36.7 °C) 97 %      MAP       --         Physical Exam    Vitals reviewed.  Constitutional: He appears well-developed and well-nourished.   HENT:   Head: Normocephalic and atraumatic.   Neck: Neck supple.   Cardiovascular:  Normal rate and regular rhythm.           Pulmonary/Chest: Breath sounds normal.   Abdominal: Abdomen is soft. Bowel sounds are normal. He exhibits no distension and no mass. There is no abdominal tenderness. There is no rebound and no guarding.   Musculoskeletal:      Cervical back: Normal and neck supple.      Thoracic back: Normal.      Lumbar back: Normal.        Back:      Neurological: He is alert and oriented to person, place, and time. He has normal strength. GCS score is 15. GCS eye subscore is 4. GCS verbal subscore is 5. GCS motor subscore is 6.   Skin: Skin is warm and dry. Capillary refill takes less than 2 seconds.   Psychiatric: He has a normal mood and affect.         Medical Screening Exam   See Full Note    ED Course   Procedures  Labs Reviewed - No data to display       Imaging Results    None          Medications - No data to display  Medical Decision Making  40-year-old male presents to the emergency department to be evaluated for pain in his right lower back over his stimulator.  He reports he had a stimulator inserted for his back pain 3 years ago.  Denies any recent fall or injury, numbness or weakness in his lower extremities, dysuria.  He said that he really just needs a work excuse because he is unable to do his work due to the pain that he has over his stimulator when he wears his pants over that area.  Diagnosis: Back pain  Prescribed Robaxin    Risk  Prescription drug management.                                      Clinical Impression:   Final diagnoses:  [M54.9] Back pain, unspecified back  location, unspecified back pain laterality, unspecified chronicity (Primary)        ED Disposition Condition    Discharge Stable          ED Prescriptions       Medication Sig Dispense Start Date End Date Auth. Provider    methocarbamoL (ROBAXIN) 500 MG Tab Take 2 tablets (1,000 mg total) by mouth 3 (three) times daily. for 5 days 30 tablet 5/26/2025 5/31/2025 Steph Howe FNP          Follow-up Information    None            [1]   Social History  Tobacco Use    Smoking status: Never    Smokeless tobacco: Never   Substance Use Topics    Alcohol use: Yes     Alcohol/week: 4.0 standard drinks of alcohol     Types: 2 Cans of beer, 2 Shots of liquor per week     Comment: occasionally    Drug use: Never        Steph Howe FNP  05/26/25 110

## 2025-05-26 NOTE — Clinical Note
"Nikolay Washingtonjules Gregorio was seen and treated in our emergency department on 5/26/2025.  He may return to work on 05/27/2025.       If you have any questions or concerns, please don't hesitate to call.      Steph Howe, RADHAP"

## 2025-05-26 NOTE — ED PROVIDER NOTES
Encounter Date: 5/24/2025       History     Chief Complaint   Patient presents with    General Illness     Has a bladder stimulator to right flank area that is sore and tender to touch due to pants and belt.       39 y/o AAM presents to the emergency department with c/o low back pain. Pt has bladder stimulator and states has pain where his pants/belt rub against where it is. Current medications not helping. He is being followed by pain management and states awaiting MRI. No lower extremity weakness or bowel or bladder incontinence. No injury or trauma. No fever or chills.     The history is provided by the patient.     Review of patient's allergies indicates:  No Known Allergies  Past Medical History:   Diagnosis Date    Essential (primary) hypertension     GERD (gastroesophageal reflux disease)     Incontinent of urine     Inflammatory disease of prostate, unspecified     Nonintractable headache 12/21/2024     Past Surgical History:   Procedure Laterality Date    intrastem stage 2 Right      Family History   Problem Relation Name Age of Onset    Hypertension Mother      Multiple sclerosis Mother       Social History[1]  Review of Systems   All other systems reviewed and are negative.      Physical Exam     Initial Vitals [05/24/25 2143]   BP Pulse Resp Temp SpO2   (!) 157/100 88 16 98.1 °F (36.7 °C) 96 %      MAP       --         Physical Exam    Constitutional: He appears well-developed and well-nourished. He is cooperative.  Non-toxic appearance.   BMI >30   Cardiovascular:  Normal rate, regular rhythm, normal heart sounds and intact distal pulses.           Pulmonary/Chest: Effort normal and breath sounds normal.   Musculoskeletal:      Lumbar back: No swelling or deformity. Normal range of motion.        Back:       Comments: NVI distally     Neurological: He is alert and oriented to person, place, and time. He has normal strength. GCS eye subscore is 4. GCS verbal subscore is 5. GCS motor subscore is 6.    Skin: Skin is warm, dry and intact. Capillary refill takes less than 2 seconds.         Medical Screening Exam   See Full Note    ED Course   Procedures  Labs Reviewed - No data to display       Imaging Results    None          Medications   ketorolac tablet 10 mg (10 mg Oral Given 5/24/25 2228)     Medical Decision Making  41 y/o AAM presents to the emergency department with c/o low back pain. Pt has bladder stimulator and states has pain where his pants/belt rub against where it is. Current medications not helping. He is being followed by pain management and states awaiting MRI. No lower extremity weakness or bowel or bladder incontinence. No injury or trauma. No fever or chills.     Problems Addressed:  Low back pain, unspecified back pain laterality, unspecified chronicity, unspecified whether sciatica present:     Details: Currently being followed/worked up by pain management. Toradol tablet given. Pt request cream that will help with his pain. Rx Diclofenac, counseled on use and supportive measures. Follow up instructions given. Warning s/s discussed and return precautions given; the patient has v/u.      Risk  OTC drugs.  Prescription drug management.                                      Clinical Impression:   Final diagnoses:  [M54.50] Low back pain, unspecified back pain laterality, unspecified chronicity, unspecified whether sciatica present (Primary)        ED Disposition Condition    Discharge Stable          ED Prescriptions       Medication Sig Dispense Start Date End Date Auth. Provider    diclofenac sodium (VOLTAREN) 1 % Gel Apply 2 g topically 4 (four) times daily. 20 g 5/24/2025 -- Ximena Inman FNP          Follow-up Information       Follow up With Specialties Details Why Contact Info    Acacia Melgoza FNP Family Medicine, Emergency Medicine  As needed [address] [facility] [place] [facility] [address]  [phone]                 [1]   Social History  Tobacco Use    Smoking status: Never    Smokeless  tobacco: Never   Substance Use Topics    Alcohol use: Yes     Alcohol/week: 4.0 standard drinks of alcohol     Types: 2 Cans of beer, 2 Shots of liquor per week     Comment: occasionally    Drug use: Never        Ximena Inman [facility]  05/26/25 9004

## 2025-05-27 ENCOUNTER — OFFICE VISIT (OUTPATIENT)
Dept: FAMILY MEDICINE | Facility: CLINIC | Age: 40
End: 2025-05-27
Payer: COMMERCIAL

## 2025-05-27 VITALS
TEMPERATURE: 98 F | DIASTOLIC BLOOD PRESSURE: 78 MMHG | BODY MASS INDEX: 42.21 KG/M2 | OXYGEN SATURATION: 97 % | HEART RATE: 80 BPM | HEIGHT: 69 IN | WEIGHT: 285 LBS | SYSTOLIC BLOOD PRESSURE: 125 MMHG

## 2025-05-27 DIAGNOSIS — G89.29 CHRONIC MIDLINE LOW BACK PAIN WITHOUT SCIATICA: ICD-10-CM

## 2025-05-27 DIAGNOSIS — M54.50 CHRONIC MIDLINE LOW BACK PAIN WITHOUT SCIATICA: ICD-10-CM

## 2025-05-27 DIAGNOSIS — L20.9 ATOPIC DERMATITIS, UNSPECIFIED TYPE: Primary | ICD-10-CM

## 2025-05-27 PROBLEM — R36.9 PENILE DISCHARGE: Status: RESOLVED | Noted: 2025-05-12 | Resolved: 2025-05-27

## 2025-05-27 PROCEDURE — 3074F SYST BP LT 130 MM HG: CPT | Mod: CPTII,,, | Performed by: NURSE PRACTITIONER

## 2025-05-27 PROCEDURE — 3008F BODY MASS INDEX DOCD: CPT | Mod: CPTII,,, | Performed by: NURSE PRACTITIONER

## 2025-05-27 PROCEDURE — 1160F RVW MEDS BY RX/DR IN RCRD: CPT | Mod: CPTII,,, | Performed by: NURSE PRACTITIONER

## 2025-05-27 PROCEDURE — 3078F DIAST BP <80 MM HG: CPT | Mod: CPTII,,, | Performed by: NURSE PRACTITIONER

## 2025-05-27 PROCEDURE — 3044F HG A1C LEVEL LT 7.0%: CPT | Mod: CPTII,,, | Performed by: NURSE PRACTITIONER

## 2025-05-27 PROCEDURE — 1159F MED LIST DOCD IN RCRD: CPT | Mod: CPTII,,, | Performed by: NURSE PRACTITIONER

## 2025-05-27 PROCEDURE — 99213 OFFICE O/P EST LOW 20 MIN: CPT | Mod: ,,, | Performed by: NURSE PRACTITIONER

## 2025-05-27 PROCEDURE — 4010F ACE/ARB THERAPY RXD/TAKEN: CPT | Mod: CPTII,,, | Performed by: NURSE PRACTITIONER

## 2025-05-27 RX ORDER — TRIAMCINOLONE ACETONIDE 1 MG/G
CREAM TOPICAL 2 TIMES DAILY
Qty: 453.6 G | Refills: 0 | Status: SHIPPED | OUTPATIENT
Start: 2025-05-27

## 2025-05-27 NOTE — PROGRESS NOTES
Subjective     Patient ID: Nikolay Gregorio is a 40 y.o. male.    Chief Complaint: Back Pain (Rash on arms and neck.) and Health Maintenance (TETANUS VACCINE Never done/COVID-19 Vaccine(4 - 2024-25 season) due on 09/01/2024 )    Pt presents with chronic low back pain and is requesting a work note for today. Rash to arms and neck that he states is eczema.       Review of Systems   Constitutional:  Negative for activity change, appetite change, fatigue and fever.   HENT:  Negative for nasal congestion, nosebleeds, postnasal drip, rhinorrhea, sinus pressure/congestion, sneezing and sore throat.    Eyes:  Negative for pain and itching.   Respiratory:  Negative for cough, chest tightness, shortness of breath, wheezing and stridor.    Cardiovascular:  Negative for chest pain.   Gastrointestinal:  Negative for abdominal pain.   Genitourinary:  Negative for dysuria.   Musculoskeletal:  Positive for back pain.   Integumentary:  Positive for rash.   Neurological:  Negative for dizziness and headaches.   Psychiatric/Behavioral:  Negative for behavioral problems and confusion.           Objective     Physical Exam  Vitals and nursing note reviewed.   Constitutional:       Appearance: Normal appearance.   Cardiovascular:      Rate and Rhythm: Normal rate and regular rhythm.      Heart sounds: Normal heart sounds.   Pulmonary:      Effort: Pulmonary effort is normal.      Breath sounds: Normal breath sounds.   Musculoskeletal:         General: Normal range of motion.   Skin:     Comments: Dry skin noted to arms and neck   Neurological:      Mental Status: He is alert and oriented to person, place, and time.   Psychiatric:         Mood and Affect: Mood normal.         Behavior: Behavior normal.            Assessment and Plan     1. Atopic dermatitis, unspecified type  -     triamcinolone acetonide 0.1% (KENALOG) 0.1 % cream; Apply topically 2 (two) times daily. To arms and neck  Dispense: 453.6 g; Refill: 0  Moisturize skin  daily    2. Chronic midline low back pain without sciatica  Follow-up with neuro and pain tx      Notify clinic if symptoms worsen or persist.          Follow up if symptoms worsen or fail to improve.

## 2025-05-27 NOTE — LETTER
May 27, 2025      [facility]  [address] [facility] [address]  Phone: [phone]  Fax: [phone]       Patient: Nikolay Gregorio   YOB: 1985  Date of Visit: 05/27/2025    To Whom It May Concern:    Ai Gregorio  was at [facility] on 05/27/2025. The patient may return to work/school on 5/28/2025 with no restrictions. If you have any questions or concerns, or if I can be of further assistance, please do not hesitate to contact me.    Sincerely,    TIFFANY Garcia

## 2025-05-29 ENCOUNTER — PATIENT OUTREACH (OUTPATIENT)
Facility: OTHER | Age: 40
End: 2025-05-29
Payer: COMMERCIAL

## 2025-05-29 NOTE — PROGRESS NOTES
ED navigator contacted patient to assist with scheduling a post ED 7 day follow up with PCP. Patient states that he went to see PCP after going to the ED. Patient states that his back is hurting but starting to feel a little better. Patient has been taking the medicine that the ED prescribed. Patient declined ED navigation assessment and denied any needs at this time. Patient states that he is at work at the moment and can't talk.     Amber Hoskins ED Navigator   [phone]